# Patient Record
Sex: FEMALE | Race: WHITE | Employment: FULL TIME | URBAN - METROPOLITAN AREA
[De-identification: names, ages, dates, MRNs, and addresses within clinical notes are randomized per-mention and may not be internally consistent; named-entity substitution may affect disease eponyms.]

---

## 2017-12-22 ENCOUNTER — APPOINTMENT (OUTPATIENT)
Dept: OTHER | Facility: HOSPITAL | Age: 42
End: 2017-12-22
Attending: PREVENTIVE MEDICINE

## 2018-09-09 ENCOUNTER — HOSPITAL ENCOUNTER (OUTPATIENT)
Age: 43
Discharge: HOME | End: 2018-09-09
Attending: FAMILY MEDICINE
Payer: COMMERCIAL

## 2018-09-09 VITALS
BODY MASS INDEX: 29 KG/M2 | OXYGEN SATURATION: 100 % | DIASTOLIC BLOOD PRESSURE: 94 MMHG | TEMPERATURE: 98 F | RESPIRATION RATE: 18 BRPM | HEART RATE: 87 BPM | WEIGHT: 175 LBS | SYSTOLIC BLOOD PRESSURE: 138 MMHG

## 2018-09-09 DIAGNOSIS — H10.32 ACUTE CONJUNCTIVITIS OF LEFT EYE, UNSPECIFIED ACUTE CONJUNCTIVITIS TYPE: Primary | ICD-10-CM

## 2018-09-09 PROCEDURE — 99203 OFFICE O/P NEW LOW 30 MIN: CPT

## 2018-09-09 RX ORDER — OFLOXACIN 3 MG/ML
2 SOLUTION/ DROPS OPHTHALMIC EVERY 8 HOURS
Qty: 10 ML | Refills: 0 | Status: SHIPPED | OUTPATIENT
Start: 2018-09-09 | End: 2018-09-16

## 2018-09-09 NOTE — ED INITIAL ASSESSMENT (HPI)
Pt. Was told by a friend/co-worker on Thursday that her Left eye was red. Woke up today & it was a little crusty. Denies severe itching. Only wears reading glasses as needed.

## 2018-09-09 NOTE — ED PROVIDER NOTES
Patient Seen in: [facility] [place]    History   Patient presents with: Eye Visual Problem (opthalmic): Lt.    Stated Complaint: pink eye    HPI  This is a 47-year-old female coming in with left eye complaints.   Some left eye redness a periorbital area normal : Yes  - PERRL+ : Yes  - EOMI+: Yes  - Cornea : clear  - Conjunctiva: palpebral and bulbar conjunctival injection +   - Perilimbal injection: No  - Eyelids normal : Yes  - FB : No  - Photophobia: No  - Sclera  Injected: Yes, No  - P

## 2019-10-30 ENCOUNTER — OFFICE VISIT (OUTPATIENT)
Dept: OBGYN CLINIC | Facility: CLINIC | Age: 44
End: 2019-10-30
Payer: COMMERCIAL

## 2019-10-30 VITALS
HEIGHT: 65 IN | SYSTOLIC BLOOD PRESSURE: 118 MMHG | DIASTOLIC BLOOD PRESSURE: 74 MMHG | BODY MASS INDEX: 32.82 KG/M2 | WEIGHT: 197 LBS

## 2019-10-30 DIAGNOSIS — Z30.431 SURVEILLANCE OF (INTRAUTERINE) CONTRACEPTIVE DEVICE: ICD-10-CM

## 2019-10-30 DIAGNOSIS — Z12.39 SCREENING FOR MALIGNANT NEOPLASM OF BREAST: ICD-10-CM

## 2019-10-30 DIAGNOSIS — A63.0 WARTS, GENITAL: ICD-10-CM

## 2019-10-30 DIAGNOSIS — Z12.4 SCREENING FOR MALIGNANT NEOPLASM OF CERVIX: ICD-10-CM

## 2019-10-30 DIAGNOSIS — Z01.419 WELL WOMAN EXAM WITH ROUTINE GYNECOLOGICAL EXAM: Primary | ICD-10-CM

## 2019-10-30 PROCEDURE — 88175 CYTOPATH C/V AUTO FLUID REDO: CPT | Performed by: NURSE PRACTITIONER

## 2019-10-30 PROCEDURE — 99386 PREV VISIT NEW AGE 40-64: CPT | Performed by: NURSE PRACTITIONER

## 2019-10-30 PROCEDURE — 87624 HPV HI-RISK TYP POOLED RSLT: CPT | Performed by: NURSE PRACTITIONER

## 2019-10-30 NOTE — PROGRESS NOTES
HPI:   Hasmukh Pugh is a 45year old  who presents for an annual gynecological exam. Pt reports concern that her genital warts have reoccurred. She would like to discuss treatment options. Menses: No LMP recorded. Patient has had an implant.     No dominant masses on palpation, no lymphadenopathy  EXTREMITIES: No edema  EXTERNAL GENITALIA: Normal appearance for age, 3 small warts present between labia majora bilaterally.    URETHRA: No masses or tenderness, no prolapse  VAGINA: Normal, physiologic dis neoplasm of cervix  -     HPV HIGH RISK , THIN PREP COLLECTION; Future  -     THINPREP PAP SMEAR B; Future    Screening for malignant neoplasm of breast  -     [facility] MIRNA 2D+3D SCREENING BILAT (CPT=77067/90815);  Future    Surveillance of (intrauterine) contr

## 2019-11-11 ENCOUNTER — HOSPITAL ENCOUNTER (OUTPATIENT)
Dept: MAMMOGRAPHY | Facility: HOSPITAL | Age: 44
Discharge: HOME | End: 2019-11-11
Attending: NURSE PRACTITIONER
Payer: COMMERCIAL

## 2019-11-11 DIAGNOSIS — Z12.39 SCREENING FOR MALIGNANT NEOPLASM OF BREAST: ICD-10-CM

## 2019-11-11 PROCEDURE — 77063 BREAST TOMOSYNTHESIS BI: CPT | Performed by: NURSE PRACTITIONER

## 2019-11-11 PROCEDURE — 77067 SCR MAMMO BI INCL CAD: CPT | Performed by: NURSE PRACTITIONER

## 2019-11-15 ENCOUNTER — OFFICE VISIT (OUTPATIENT)
Dept: OBGYN CLINIC | Facility: CLINIC | Age: 44
End: 2019-11-15
Payer: COMMERCIAL

## 2019-11-15 VITALS
HEIGHT: 65 IN | DIASTOLIC BLOOD PRESSURE: 74 MMHG | BODY MASS INDEX: 32.65 KG/M2 | WEIGHT: 196 LBS | SYSTOLIC BLOOD PRESSURE: 116 MMHG | HEART RATE: 84 BPM

## 2019-11-15 DIAGNOSIS — B07.9: Primary | ICD-10-CM

## 2019-11-15 PROCEDURE — 56515 DESTROY VULVA LESION/S COMPL: CPT | Performed by: NURSE PRACTITIONER

## 2019-11-15 NOTE — PROGRESS NOTES
Jana Angulo is a 45year old female.     HPI:   Patient presents with:  Procedure: Wart removal       -Dx Discussed - Pt assured  -Pt counseled on safe sex practices and advised to refrain from intercourse during outbreaks. -Encouaged pt to use condoms dur

## 2019-12-16 ENCOUNTER — HOSPITAL ENCOUNTER (OUTPATIENT)
Dept: MAMMOGRAPHY | Facility: HOSPITAL | Age: 44
Discharge: HOME | End: 2019-12-16
Attending: NURSE PRACTITIONER
Payer: COMMERCIAL

## 2019-12-16 DIAGNOSIS — R92.2 INCONCLUSIVE MAMMOGRAM: ICD-10-CM

## 2019-12-16 PROCEDURE — 77061 BREAST TOMOSYNTHESIS UNI: CPT | Performed by: NURSE PRACTITIONER

## 2019-12-16 PROCEDURE — 77065 DX MAMMO INCL CAD UNI: CPT | Performed by: NURSE PRACTITIONER

## 2019-12-16 NOTE — IMAGING NOTE
This Breast Care RN assisted Dr. Tamara Mcmillan with recommendation for a RB 2 site stereo biopsy for calcs. Procedure reviewed and all questions answered. Emotional and educational support given.    On the day of the biopsy, pt instructed to take Tylenol 1000mg P

## 2020-01-07 ENCOUNTER — HOSPITAL ENCOUNTER (OUTPATIENT)
Dept: MAMMOGRAPHY | Facility: HOSPITAL | Age: 45
Discharge: HOME | End: 2020-01-07
Attending: NURSE PRACTITIONER
Payer: COMMERCIAL

## 2020-01-07 DIAGNOSIS — R92.1 BREAST CALCIFICATIONS: ICD-10-CM

## 2020-01-07 PROCEDURE — 88305 TISSUE EXAM BY PATHOLOGIST: CPT | Performed by: NURSE PRACTITIONER

## 2020-01-07 PROCEDURE — 88360 TUMOR IMMUNOHISTOCHEM/MANUAL: CPT | Performed by: NURSE PRACTITIONER

## 2020-01-07 PROCEDURE — 19081 BX BREAST 1ST LESION STRTCTC: CPT | Performed by: NURSE PRACTITIONER

## 2020-01-07 PROCEDURE — 19082 BX BREAST ADD LESION STRTCTC: CPT | Performed by: NURSE PRACTITIONER

## 2020-01-08 ENCOUNTER — TELEPHONE (OUTPATIENT)
Dept: OBGYN CLINIC | Facility: CLINIC | Age: 45
End: 2020-01-08

## 2020-01-08 ENCOUNTER — TELEPHONE (OUTPATIENT)
Dept: CT IMAGING | Facility: HOSPITAL | Age: 45
End: 2020-01-08

## 2020-01-08 NOTE — TELEPHONE ENCOUNTER
Telephoned Kamilla Jorge Luis Zamarripa and name,  verified with pt offered to meet with her today as her results are in. Pt requested results by phone. Notified [place] of LB stereo biopsy result of IDC and DCIS. Emotional support given.  YECENIA [place] is r

## 2020-01-08 NOTE — TELEPHONE ENCOUNTER
Left detailed message for breast care coordinator informing that Dr. Susan Frye should be who patient is referred to.    (Spoke with [facility])

## 2020-01-08 NOTE — TELEPHONE ENCOUNTER
[id number] Swanson Breast care coordinator calling and has a critical result    They need to know what surgeon to send this pt to    Pt has not been notified  Please call [phone]

## 2020-01-08 NOTE — TELEPHONE ENCOUNTER
LM w staff requesting call back w name of surgeon for f/u SP LB 2 site stereo bx. Awaiting call back.

## 2020-01-10 ENCOUNTER — OFFICE VISIT (OUTPATIENT)
Dept: SURGERY | Facility: CLINIC | Age: 45
End: 2020-01-10
Payer: COMMERCIAL

## 2020-01-10 ENCOUNTER — NURSE NAVIGATOR ENCOUNTER (OUTPATIENT)
Dept: HEMATOLOGY/ONCOLOGY | Facility: HOSPITAL | Age: 45
End: 2020-01-10

## 2020-01-10 ENCOUNTER — GENETICS ENCOUNTER (OUTPATIENT)
Dept: GENETICS | Facility: HOSPITAL | Age: 45
End: 2020-01-10
Attending: GENETIC COUNSELOR, MS
Payer: COMMERCIAL

## 2020-01-10 VITALS
DIASTOLIC BLOOD PRESSURE: 84 MMHG | BODY MASS INDEX: 33.66 KG/M2 | WEIGHT: 202 LBS | SYSTOLIC BLOOD PRESSURE: 123 MMHG | OXYGEN SATURATION: 100 % | RESPIRATION RATE: 16 BRPM | HEIGHT: 65 IN | HEART RATE: 81 BPM

## 2020-01-10 DIAGNOSIS — Z17.0 MALIGNANT NEOPLASM OF UPPER-OUTER QUADRANT OF LEFT BREAST IN FEMALE, ESTROGEN RECEPTOR POSITIVE (HCC): Primary | ICD-10-CM

## 2020-01-10 DIAGNOSIS — C50.412 MALIGNANT NEOPLASM OF UPPER-OUTER QUADRANT OF LEFT BREAST IN FEMALE, ESTROGEN RECEPTOR POSITIVE (HCC): Primary | ICD-10-CM

## 2020-01-10 PROCEDURE — 96040 HC GENETIC COUNSELING EA 30 MIN: CPT | Performed by: GENETIC COUNSELOR, MS

## 2020-01-10 PROCEDURE — 99205 OFFICE O/P NEW HI 60 MIN: CPT | Performed by: SURGERY

## 2020-01-10 NOTE — PROGRESS NOTES
Breast Surgery New Patient Consultation    This is the first visit for this 45year old woman, referred by Raven Rizvi, who presents for evaluation of breast cancer.     History of Present Illness:   Ms. Tanner Petit is a 45year old woman who prese Amoxicillin-Pot Clavulanate 875-125 MG Oral Tab, Take 1 tablet by mouth 2 (two) times daily for 10 days. , Disp: 20 tablet, Rfl: 0        Allergies:      Adhesive Tape (Sendy*    RASH    Family History:   Family History   Problem Relation Age of Onset   • bloody stools, constipation, yellowing of the skin, indigestion, nausea, change in bowel habits, diarrhea, abdominal pain or vomiting blood.      Genitourinary:  The patient denies frequent urination, needing to get up at night to urinate, urinary hesitancy no palpable masses. The trachea is in the midline. Conjunctiva are clear, non-icteric. Chest: The chest expands symmetrically. The lungs are clear to auscultation. Heart: The rhythm is regular. There are no murmurs, rubs, gallops or thrills.     GigSocial distinction between local and systemic disease and local and systemic therapy.  For local treatment options, I explained the risks and benefits of breast conservation and mastectomy (with or without reconstruction), including the fact that survival rates ar and she understood and agreed to the proposed plan. She was given ample opportunity for questions and those questions were answered to her satisfaction.  She has been  encouraged to contact the office with any questions or concerns prior to her next appoint

## 2020-01-10 NOTE — PROGRESS NOTES
Met with patient in clinic. Introduced myself as the breast navigator nurse and explained my role and how I will work with all of the physicians involved in her care.  Explained the role of all of the physicians involved in her care including the surgeonjuan carlos

## 2020-01-10 NOTE — PROGRESS NOTES
Referring Provider: Suhail Barger MD    Reason for Referral:  Epi Arana was referred for genetic counseling because of a new diagnosis of breast cancer.   Ms. Gladys Ribeiro is a 45year-old woman of [place] descent who was diagnosed with an ER/CT- recommendations (e.g., CHEK2, DA, RAD51C, RAD51D, PALB2) are identified in 3-10% of individuals tested using a multigene panel. Risk Assessment:   Ms. Sabrina Randolph meets NCCN Guidelines criteria for BRCA1/2 testing.   Based on My[employer] data the likelihood for for some of the genes for which testing is available, the associated cancer risks have yet to be determined and medical management recommendations may not yet be available for individuals with pathogenic variants in these genes.  If she were to test positiv decided to proceed with genetic testing. Written consent was obtained. Blood and paperwork were sent to [facility] for their Breast STAT panel (DA, BRCA1, BRCA2, CDH1, CHEK2, PALB2, PTEN, STK11, and TP53). I anticipate that Ms. Zamarripa's results will be tosha

## 2020-01-20 ENCOUNTER — GENETICS ENCOUNTER (OUTPATIENT)
Dept: HEMATOLOGY/ONCOLOGY | Facility: HOSPITAL | Age: 45
End: 2020-01-20

## 2020-01-20 ENCOUNTER — NURSE NAVIGATOR ENCOUNTER (OUTPATIENT)
Dept: HEMATOLOGY/ONCOLOGY | Facility: HOSPITAL | Age: 45
End: 2020-01-20

## 2020-01-20 NOTE — PROGRESS NOTES
LVM for patient regarding breast MRI, which patient cancelled. Contact information provided, encouraged pt to phone navigator with update.

## 2020-01-20 NOTE — PROGRESS NOTES
Referring Provider:       Marianna Martínez MD    Reason for Referral:  Aysha Bars had genetic testing performed on 1/10/2020 because of a new diagnosis of breast cancer at age 45.      Genetic Testing Result:  No pathogenic variant was found in the followi

## 2020-01-22 ENCOUNTER — NURSE NAVIGATOR ENCOUNTER (OUTPATIENT)
Dept: HEMATOLOGY/ONCOLOGY | Facility: HOSPITAL | Age: 45
End: 2020-01-22

## 2020-01-22 NOTE — PROGRESS NOTES
Pt returned navigator's phone call re: MRI.  Pt states that the time scheduled for her did not work, though she did not call to reschedule, she cancelled the test. Spoke with breast MRI schedulers, they will reach out to her to have testing rescheduled to p

## 2020-01-30 ENCOUNTER — TELEPHONE (OUTPATIENT)
Dept: SURGERY | Facility: CLINIC | Age: 45
End: 2020-01-30

## 2020-01-30 ENCOUNTER — HOSPITAL ENCOUNTER (OUTPATIENT)
Dept: MRI IMAGING | Facility: HOSPITAL | Age: 45
Discharge: HOME | End: 2020-01-30
Attending: SURGERY
Payer: COMMERCIAL

## 2020-01-30 DIAGNOSIS — Z17.0 MALIGNANT NEOPLASM OF UPPER-OUTER QUADRANT OF LEFT BREAST IN FEMALE, ESTROGEN RECEPTOR POSITIVE (HCC): ICD-10-CM

## 2020-01-30 DIAGNOSIS — C50.412 MALIGNANT NEOPLASM OF UPPER-OUTER QUADRANT OF LEFT BREAST IN FEMALE, ESTROGEN RECEPTOR POSITIVE (HCC): ICD-10-CM

## 2020-01-30 PROCEDURE — A9575 INJ GADOTERATE MEGLUMI 0.1ML: HCPCS | Performed by: SURGERY

## 2020-01-30 PROCEDURE — 77049 MRI BREAST C-+ W/CAD BI: CPT | Performed by: SURGERY

## 2020-01-30 NOTE — TELEPHONE ENCOUNTER
LVM for patient to let her know that Dr Sofia Shone reviewed her MRI, and the results match the mammogram with no additional findings. We can proceed with scheduling left breast wire bracketed lumpectomy with left sentinel lymph node biopsy.    Asked her to ca

## 2020-01-31 ENCOUNTER — TELEPHONE (OUTPATIENT)
Dept: SURGERY | Facility: CLINIC | Age: 45
End: 2020-01-31

## 2020-01-31 DIAGNOSIS — C50.412 MALIGNANT NEOPLASM OF UPPER-OUTER QUADRANT OF LEFT BREAST IN FEMALE, ESTROGEN RECEPTOR POSITIVE (HCC): Primary | ICD-10-CM

## 2020-01-31 DIAGNOSIS — Z17.0 MALIGNANT NEOPLASM OF UPPER-OUTER QUADRANT OF LEFT BREAST IN FEMALE, ESTROGEN RECEPTOR POSITIVE (HCC): Primary | ICD-10-CM

## 2020-01-31 NOTE — TELEPHONE ENCOUNTER
Contacted the patient to review the MRI results, and plan for wire bracketed lumpectomy with left SLN biopsy. Questions addressed. Reviewed surgical instructions with patient, and will email her a copy. Pt offered 2/14 or 2/20 at [facility].    Pt bari

## 2020-02-13 ENCOUNTER — TELEPHONE (OUTPATIENT)
Dept: MAMMOGRAPHY | Facility: HOSPITAL | Age: 45
End: 2020-02-13

## 2020-02-13 NOTE — TELEPHONE ENCOUNTER
Spoke with Ny Olsen regarding Council Lymph Node mapping to be done in nuclear medicine department before breast lumpectomy surgery scheduled for 2-14-20. Also reviewed wire localization to be done in [facility].  Both procedures explained and all terese

## 2020-02-14 ENCOUNTER — ANESTHESIA (OUTPATIENT)
Dept: SURGERY | Facility: HOSPITAL | Age: 45
End: 2020-02-14
Payer: COMMERCIAL

## 2020-02-14 ENCOUNTER — HOSPITAL ENCOUNTER (OUTPATIENT)
Dept: NUCLEAR MEDICINE | Facility: HOSPITAL | Age: 45
Discharge: HOME | End: 2020-02-14
Attending: SURGERY
Payer: COMMERCIAL

## 2020-02-14 ENCOUNTER — HOSPITAL ENCOUNTER (OUTPATIENT)
Facility: HOSPITAL | Age: 45
Setting detail: HOSPITAL OUTPATIENT SURGERY
Discharge: HOME | End: 2020-02-14
Attending: SURGERY | Admitting: SURGERY
Payer: COMMERCIAL

## 2020-02-14 ENCOUNTER — ANESTHESIA EVENT (OUTPATIENT)
Dept: SURGERY | Facility: HOSPITAL | Age: 45
End: 2020-02-14
Payer: COMMERCIAL

## 2020-02-14 ENCOUNTER — APPOINTMENT (OUTPATIENT)
Dept: MAMMOGRAPHY | Facility: HOSPITAL | Age: 45
End: 2020-02-14
Attending: SURGERY
Payer: COMMERCIAL

## 2020-02-14 VITALS
HEIGHT: 65 IN | HEART RATE: 102 BPM | DIASTOLIC BLOOD PRESSURE: 78 MMHG | TEMPERATURE: 99 F | OXYGEN SATURATION: 98 % | WEIGHT: 205.25 LBS | BODY MASS INDEX: 34.2 KG/M2 | RESPIRATION RATE: 16 BRPM | SYSTOLIC BLOOD PRESSURE: 112 MMHG

## 2020-02-14 DIAGNOSIS — C50.412 MALIGNANT NEOPLASM OF UPPER-OUTER QUADRANT OF LEFT BREAST IN FEMALE, ESTROGEN RECEPTOR POSITIVE (HCC): ICD-10-CM

## 2020-02-14 DIAGNOSIS — Z17.0 MALIGNANT NEOPLASM OF UPPER-OUTER QUADRANT OF LEFT BREAST IN FEMALE, ESTROGEN RECEPTOR POSITIVE (HCC): ICD-10-CM

## 2020-02-14 LAB
POCT LOT NUMBER: NORMAL
POCT URINE PREGNANCY: NEGATIVE

## 2020-02-14 PROCEDURE — 88307 TISSUE EXAM BY PATHOLOGIST: CPT | Performed by: SURGERY

## 2020-02-14 PROCEDURE — 78195 LYMPH SYSTEM IMAGING: CPT | Performed by: SURGERY

## 2020-02-14 PROCEDURE — 88360 TUMOR IMMUNOHISTOCHEM/MANUAL: CPT | Performed by: SURGERY

## 2020-02-14 PROCEDURE — 19281 PERQ DEVICE BREAST 1ST IMAG: CPT | Performed by: SURGERY

## 2020-02-14 PROCEDURE — 10036 PLMT SFT TISS LOCLZJ DEV EA: CPT | Performed by: SURGERY

## 2020-02-14 PROCEDURE — 88342 IMHCHEM/IMCYTCHM 1ST ANTB: CPT | Performed by: SURGERY

## 2020-02-14 PROCEDURE — 0HBU0ZZ EXCISION OF LEFT BREAST, OPEN APPROACH: ICD-10-PCS | Performed by: SURGERY

## 2020-02-14 PROCEDURE — 81025 URINE PREGNANCY TEST: CPT | Performed by: SURGERY

## 2020-02-14 PROCEDURE — 07B60ZX EXCISION OF LEFT AXILLARY LYMPHATIC, OPEN APPROACH, DIAGNOSTIC: ICD-10-PCS | Performed by: SURGERY

## 2020-02-14 PROCEDURE — 88305 TISSUE EXAM BY PATHOLOGIST: CPT | Performed by: SURGERY

## 2020-02-14 PROCEDURE — 76098 X-RAY EXAM SURGICAL SPECIMEN: CPT | Performed by: SURGERY

## 2020-02-14 RX ORDER — LIDOCAINE AND PRILOCAINE 25; 25 MG/G; MG/G
CREAM TOPICAL
Status: COMPLETED
Start: 2020-02-14 | End: 2020-02-14

## 2020-02-14 RX ORDER — LIDOCAINE AND PRILOCAINE 25; 25 MG/G; MG/G
CREAM TOPICAL ONCE
Status: COMPLETED | OUTPATIENT
Start: 2020-02-14 | End: 2020-02-14

## 2020-02-14 RX ORDER — DIAZEPAM 5 MG/1
5 TABLET ORAL AS NEEDED
Status: DISCONTINUED | OUTPATIENT
Start: 2020-02-14 | End: 2020-02-14 | Stop reason: HOSPADM

## 2020-02-14 RX ORDER — ONDANSETRON 2 MG/ML
4 INJECTION INTRAMUSCULAR; INTRAVENOUS AS NEEDED
Status: DISCONTINUED | OUTPATIENT
Start: 2020-02-14 | End: 2020-02-14

## 2020-02-14 RX ORDER — NALOXONE HYDROCHLORIDE 0.4 MG/ML
80 INJECTION, SOLUTION INTRAMUSCULAR; INTRAVENOUS; SUBCUTANEOUS AS NEEDED
Status: DISCONTINUED | OUTPATIENT
Start: 2020-02-14 | End: 2020-02-14

## 2020-02-14 RX ORDER — MIDAZOLAM HYDROCHLORIDE 1 MG/ML
1 INJECTION INTRAMUSCULAR; INTRAVENOUS EVERY 5 MIN PRN
Status: DISCONTINUED | OUTPATIENT
Start: 2020-02-14 | End: 2020-02-14

## 2020-02-14 RX ORDER — DEXAMETHASONE SODIUM PHOSPHATE 4 MG/ML
VIAL (ML) INJECTION AS NEEDED
Status: DISCONTINUED | OUTPATIENT
Start: 2020-02-14 | End: 2020-02-14 | Stop reason: SURG

## 2020-02-14 RX ORDER — METOCLOPRAMIDE HYDROCHLORIDE 5 MG/ML
INJECTION INTRAMUSCULAR; INTRAVENOUS AS NEEDED
Status: DISCONTINUED | OUTPATIENT
Start: 2020-02-14 | End: 2020-02-14 | Stop reason: SURG

## 2020-02-14 RX ORDER — LABETALOL HYDROCHLORIDE 5 MG/ML
5 INJECTION, SOLUTION INTRAVENOUS EVERY 5 MIN PRN
Status: DISCONTINUED | OUTPATIENT
Start: 2020-02-14 | End: 2020-02-14

## 2020-02-14 RX ORDER — CEFAZOLIN SODIUM/WATER 2 G/20 ML
SYRINGE (ML) INTRAVENOUS
Status: DISCONTINUED
Start: 2020-02-14 | End: 2020-02-14

## 2020-02-14 RX ORDER — CEFAZOLIN SODIUM/WATER 2 G/20 ML
2 SYRINGE (ML) INTRAVENOUS ONCE
Status: COMPLETED | OUTPATIENT
Start: 2020-02-14 | End: 2020-02-14

## 2020-02-14 RX ORDER — SODIUM CHLORIDE, SODIUM LACTATE, POTASSIUM CHLORIDE, CALCIUM CHLORIDE 600; 310; 30; 20 MG/100ML; MG/100ML; MG/100ML; MG/100ML
INJECTION, SOLUTION INTRAVENOUS CONTINUOUS
Status: DISCONTINUED | OUTPATIENT
Start: 2020-02-14 | End: 2020-02-14

## 2020-02-14 RX ORDER — HYDROCODONE BITARTRATE AND ACETAMINOPHEN 5; 325 MG/1; MG/1
1-2 TABLET ORAL EVERY 4 HOURS PRN
Qty: 30 TABLET | Refills: 0 | Status: SHIPPED | OUTPATIENT
Start: 2020-02-14 | End: 2020-03-06 | Stop reason: ALTCHOICE

## 2020-02-14 RX ORDER — MEPERIDINE HYDROCHLORIDE 25 MG/ML
12.5 INJECTION INTRAMUSCULAR; INTRAVENOUS; SUBCUTANEOUS AS NEEDED
Status: DISCONTINUED | OUTPATIENT
Start: 2020-02-14 | End: 2020-02-14

## 2020-02-14 RX ORDER — ONDANSETRON 2 MG/ML
INJECTION INTRAMUSCULAR; INTRAVENOUS AS NEEDED
Status: DISCONTINUED | OUTPATIENT
Start: 2020-02-14 | End: 2020-02-14 | Stop reason: SURG

## 2020-02-14 RX ORDER — HYDROCODONE BITARTRATE AND ACETAMINOPHEN 5; 325 MG/1; MG/1
2 TABLET ORAL AS NEEDED
Status: COMPLETED | OUTPATIENT
Start: 2020-02-14 | End: 2020-02-14

## 2020-02-14 RX ORDER — ACETAMINOPHEN 500 MG
1000 TABLET ORAL ONCE
Status: DISCONTINUED | OUTPATIENT
Start: 2020-02-14 | End: 2020-02-14 | Stop reason: HOSPADM

## 2020-02-14 RX ORDER — HYDROCODONE BITARTRATE AND ACETAMINOPHEN 5; 325 MG/1; MG/1
1 TABLET ORAL AS NEEDED
Status: COMPLETED | OUTPATIENT
Start: 2020-02-14 | End: 2020-02-14

## 2020-02-14 RX ORDER — KETOROLAC TROMETHAMINE 30 MG/ML
INJECTION, SOLUTION INTRAMUSCULAR; INTRAVENOUS AS NEEDED
Status: DISCONTINUED | OUTPATIENT
Start: 2020-02-14 | End: 2020-02-14 | Stop reason: SURG

## 2020-02-14 RX ORDER — LIDOCAINE HYDROCHLORIDE AND EPINEPHRINE 10; 10 MG/ML; UG/ML
INJECTION, SOLUTION INFILTRATION; PERINEURAL AS NEEDED
Status: DISCONTINUED | OUTPATIENT
Start: 2020-02-14 | End: 2020-02-14 | Stop reason: HOSPADM

## 2020-02-14 RX ORDER — LIDOCAINE HYDROCHLORIDE 10 MG/ML
INJECTION, SOLUTION EPIDURAL; INFILTRATION; INTRACAUDAL; PERINEURAL AS NEEDED
Status: DISCONTINUED | OUTPATIENT
Start: 2020-02-14 | End: 2020-02-14 | Stop reason: SURG

## 2020-02-14 RX ORDER — BUPIVACAINE HYDROCHLORIDE 5 MG/ML
INJECTION, SOLUTION EPIDURAL; INTRACAUDAL AS NEEDED
Status: DISCONTINUED | OUTPATIENT
Start: 2020-02-14 | End: 2020-02-14 | Stop reason: HOSPADM

## 2020-02-14 RX ORDER — 0.9 % SODIUM CHLORIDE 0.9 %
VIAL (ML) INJECTION AS NEEDED
Status: DISCONTINUED | OUTPATIENT
Start: 2020-02-14 | End: 2020-02-14 | Stop reason: HOSPADM

## 2020-02-14 RX ORDER — HYDROMORPHONE HYDROCHLORIDE 1 MG/ML
0.4 INJECTION, SOLUTION INTRAMUSCULAR; INTRAVENOUS; SUBCUTANEOUS EVERY 5 MIN PRN
Status: DISCONTINUED | OUTPATIENT
Start: 2020-02-14 | End: 2020-02-14

## 2020-02-14 RX ADMIN — METOCLOPRAMIDE HYDROCHLORIDE 10 MG: 5 INJECTION INTRAMUSCULAR; INTRAVENOUS at 11:12:00

## 2020-02-14 RX ADMIN — ONDANSETRON 4 MG: 2 INJECTION INTRAMUSCULAR; INTRAVENOUS at 11:12:00

## 2020-02-14 RX ADMIN — SODIUM CHLORIDE, SODIUM LACTATE, POTASSIUM CHLORIDE, CALCIUM CHLORIDE: 600; 310; 30; 20 INJECTION, SOLUTION INTRAVENOUS at 11:53:00

## 2020-02-14 RX ADMIN — CEFAZOLIN SODIUM/WATER 2 G: 2 G/20 ML SYRINGE (ML) INTRAVENOUS at 11:12:00

## 2020-02-14 RX ADMIN — DEXAMETHASONE SODIUM PHOSPHATE 8 MG: 4 MG/ML VIAL (ML) INJECTION at 11:12:00

## 2020-02-14 RX ADMIN — KETOROLAC TROMETHAMINE 30 MG: 30 INJECTION, SOLUTION INTRAMUSCULAR; INTRAVENOUS at 11:44:00

## 2020-02-14 RX ADMIN — LIDOCAINE HYDROCHLORIDE 50 MG: 10 INJECTION, SOLUTION EPIDURAL; INFILTRATION; INTRACAUDAL; PERINEURAL at 11:06:00

## 2020-02-14 NOTE — BRIEF OP NOTE
Pre-Operative Diagnosis: Malignant neoplasm of upper-outer quadrant of left breast in female, [facility] receptor [facility] [C50.412, Z17.0]     Post-Operative Diagnosis: Malignant neoplasm of upper-outer quadrant of left breast in female, estrogen rece

## 2020-02-14 NOTE — IMAGING NOTE
SLN mapping of left breast for [address] today. Written and verbal education given re lymphedema, breast conservation surgery and post surgical exercises. Then assisted Dr Radha Palacios with mammogram guided needle localization x 3 for lumpectomy.  Pt to

## 2020-02-14 NOTE — ANESTHESIA PREPROCEDURE EVALUATION
PRE-OP EVALUATION    Patient Name: José Brown    Pre-op Diagnosis: Malignant ne[facility] of upper[facility]outer quadrant of left [facility], [facility] [C50.412, Z17.0]    Procedure(s):  left wire bracketed lumpectomy with left sent II  Mouth opening: >3 FB  TM distance: > 6 cm  Neck ROM: full Cardiovascular    Cardiovascular exam normal.         Dental    No notable dental history.          Pulmonary    Pulmonary exam normal.                 Other findings            ASA: 1   Plan: MA

## 2020-02-14 NOTE — ANESTHESIA POSTPROCEDURE EVALUATION
[address] Reeves Patient Status:  Hospital Outpatient Surgery   Age/Gender 45year old female MRN [id number]   [facility] SURGERY Attending Renetta Kelly MD   Hosp Day # 0 PCP Beatriz Zhou MD       Anesthesia Post

## 2020-02-14 NOTE — H&P
History of Present Illness:   Ms. Hector Alex is a 45year old woman who presents with imaging detected left breast cancer. The patient denies any palpable masses, nipple discharge, skin changes or axillary symptoms.   She has no personal prior histo    Adhesive Tape (Sendy*    RASH     Family History:         Family History   Problem Relation Age of Onset   • Breast Cancer Mother 79   • Cancer Maternal Grandmother           lung   • Breast Cancer Maternal Grandmother 61   • Other (Other) Maternal Gra or vomiting blood.      Genitourinary:  The patient denies frequent urination, needing to get up at night to urinate, urinary hesitancy or retaining urine, painful urination, urinary incontinence, decreased urine stream, blood in the urine or vaginal/penil expands symmetrically. The lungs are clear to auscultation.     Heart: The rhythm is regular. There are no murmurs, rubs, gallops or thrills.     Breasts:  Her breasts are symmetrical with a cup size B.   Right breast: The skin, nipple ,and areola appear n I explained the risks and benefits of breast conservation and mastectomy (with or without reconstruction), including the fact that survival rates are equal with these two approaches.  If breast conservation is elected, I explained the need for free margins, those questions were answered to her satisfaction. She has been  encouraged to contact the office with any questions or concerns prior to her next appointment.      Pre-op Diagnosis: Malignant neoplasm of upper-outer quadrant of left breast in female, estro

## 2020-02-14 NOTE — ANESTHESIA PROCEDURE NOTES
Airway  Urgency: elective      General Information and Staff    Patient location during procedure: OR  Anesthesiologist: Staci Lin MD  Resident/CRNA: Adilson Springer CRNA  Performed: CRNA     Indications and Patient Condition  Indications for airway m

## 2020-02-15 NOTE — OPERATIVE REPORT
[facility]    PATIENT'S NAME: Tim, [id number] [facility] PHYSICIAN: Levon Travis. Ming Méndez M.D. OPERATING PHYSICIAN: Levon Travis. Ming Méndez M.D.    PATIENT ACCOUNT#:   [de-identified]    LOCATION:  [address]  MEDICAL RECORD #:    lymphoscintigraphy for mapping for sentinel lymph node identification preoperatively. She was brought to the operating room and placed in supine position. She was properly padded and secured. She was given a dose of IV antibiotics.   Sequential compressi oriented short stitch and 1 hemoclip in the superior position and a long stitch and 2 hemoclips in the lateral position was placed in the imaging device where specimen x-ray confirmed the presence of the targeted clips, residual calcifications with adequat

## 2020-02-21 ENCOUNTER — NURSE NAVIGATOR ENCOUNTER (OUTPATIENT)
Dept: HEMATOLOGY/ONCOLOGY | Facility: HOSPITAL | Age: 45
End: 2020-02-21

## 2020-02-21 ENCOUNTER — OFFICE VISIT (OUTPATIENT)
Dept: SURGERY | Facility: CLINIC | Age: 45
End: 2020-02-21
Payer: COMMERCIAL

## 2020-02-21 VITALS
HEART RATE: 63 BPM | SYSTOLIC BLOOD PRESSURE: 142 MMHG | TEMPERATURE: 98 F | DIASTOLIC BLOOD PRESSURE: 79 MMHG | BODY MASS INDEX: 34 KG/M2 | WEIGHT: 206 LBS | RESPIRATION RATE: 20 BRPM

## 2020-02-21 DIAGNOSIS — Z17.0 MALIGNANT NEOPLASM OF UPPER-OUTER QUADRANT OF LEFT BREAST IN FEMALE, ESTROGEN RECEPTOR POSITIVE (HCC): Primary | ICD-10-CM

## 2020-02-21 DIAGNOSIS — C50.412 MALIGNANT NEOPLASM OF UPPER-OUTER QUADRANT OF LEFT BREAST IN FEMALE, ESTROGEN RECEPTOR POSITIVE (HCC): Primary | ICD-10-CM

## 2020-02-21 PROBLEM — C50.912 INVASIVE DUCTAL CARCINOMA OF BREAST, FEMALE, LEFT (HCC): Status: ACTIVE | Noted: 2020-02-21

## 2020-02-21 PROCEDURE — 99024 POSTOP FOLLOW-UP VISIT: CPT | Performed by: SURGERY

## 2020-02-21 NOTE — PROGRESS NOTES
Oncotype DX test ordered online per Dr. King Butler    Phoned patient to discuss setting up medical and radiation oncology appointments. Will await return phone call. Contact information provided.

## 2020-02-24 ENCOUNTER — NURSE NAVIGATOR ENCOUNTER (OUTPATIENT)
Dept: HEMATOLOGY/ONCOLOGY | Facility: HOSPITAL | Age: 45
End: 2020-02-24

## 2020-02-24 ENCOUNTER — SOCIAL WORK SERVICES (OUTPATIENT)
Dept: HEMATOLOGY/ONCOLOGY | Facility: HOSPITAL | Age: 45
End: 2020-02-24

## 2020-02-24 NOTE — PROGRESS NOTES
Spoke to [facility] for [facility] regarding onoctype authorization- approved 2/21/2020- 5/20/2020- Auth #- [id number]

## 2020-02-24 NOTE — PROGRESS NOTES
ANAT left voice message with contact information requesting a  Return call to discuss FMLA paperwork.

## 2020-02-25 ENCOUNTER — NURSE NAVIGATOR ENCOUNTER (OUTPATIENT)
Dept: HEMATOLOGY/ONCOLOGY | Facility: HOSPITAL | Age: 45
End: 2020-02-25

## 2020-03-05 ENCOUNTER — SOCIAL WORK SERVICES (OUTPATIENT)
Dept: HEMATOLOGY/ONCOLOGY | Facility: HOSPITAL | Age: 45
End: 2020-03-05

## 2020-03-05 NOTE — PROGRESS NOTES
Sw spoke with patient about [place] paperwork.  Sw completed paperwork for intermittent leave from 2/14/2020-8/14/2020 at patient request.

## 2020-03-06 ENCOUNTER — OFFICE VISIT (OUTPATIENT)
Dept: SURGERY | Facility: CLINIC | Age: 45
End: 2020-03-06
Payer: COMMERCIAL

## 2020-03-06 ENCOUNTER — NURSE NAVIGATOR ENCOUNTER (OUTPATIENT)
Dept: HEMATOLOGY/ONCOLOGY | Facility: HOSPITAL | Age: 45
End: 2020-03-06

## 2020-03-06 VITALS
OXYGEN SATURATION: 100 % | SYSTOLIC BLOOD PRESSURE: 127 MMHG | HEART RATE: 87 BPM | RESPIRATION RATE: 16 BRPM | DIASTOLIC BLOOD PRESSURE: 85 MMHG

## 2020-03-06 DIAGNOSIS — Z17.0 MALIGNANT NEOPLASM OF UPPER-OUTER QUADRANT OF LEFT BREAST IN FEMALE, ESTROGEN RECEPTOR POSITIVE (HCC): Primary | ICD-10-CM

## 2020-03-06 DIAGNOSIS — C50.412 MALIGNANT NEOPLASM OF UPPER-OUTER QUADRANT OF LEFT BREAST IN FEMALE, ESTROGEN RECEPTOR POSITIVE (HCC): Primary | ICD-10-CM

## 2020-03-06 PROCEDURE — 99024 POSTOP FOLLOW-UP VISIT: CPT | Performed by: SURGERY

## 2020-03-10 NOTE — PROGRESS NOTES
[facility] Consultation Note      Patient Name: Jatinder Miranda   YOB: 1975  Medical Record Number: [id number]  Consulting Physician: Chasidy Neil. Chad Ward M.D. Referring Physician: Alexia Cisneros M.D.     Date of Consultati variant in the following 9 genes: DA, BRCA1, BRCA2, CDH1, CHEK2, PALB2, PTEN, STK11, and TP53    Patient was premenopausal at diagnosis. Performance Status   Karnofsky 100% - Normal, no complaints.     Past Medical History   Left breast cancer (as abov in gait. Psychiatric          No new or worsening depression, sara, mood swings, insomnia.     Vital Signs   /83 (BP Location: Right arm, Patient Position: Sitting, Cuff Size: large)   Pulse 83   Temp 97.6 °F (36.4 °C) (Tympanic)   Resp 16   Ht 1.6 g/dL    HCT 37.2 35.0 - 48.0 %    .0 150.0 - 450.0 10(3)uL    MCV 88.2 80.0 - 100.0 fL    MCH 28.7 26.0 - 34.0 pg    MCHC 32.5 31.0 - 37.0 g/dL    RDW 14.6 11.0 - 15.0 %    RDW-SD 47.4 (H) 35.1 - 46.3 fL    Neutrophil Absolute Prelim 4.18 1.50 - 7.7 lymph node).     B- Excision, left breast, lateral margin:  -Benign breast tissue.     C- Excision, left breast, anterior margin:  -Benign breast tissue.     D- Excision, left breast, superior margin:  -Benign breast tissue.     E- Excision, left breast, i Estrogen Receptor   -   SP1 80% Positive Cells Moderate Positive   Progesterone Receptor   -   16 60% Positive Cells Moderate Positive       B.   Left breast, 4:00 calcifications, stereotactic biopsy:  -Ductal carcinoma in situ, nuclear grade 3, cribrifor spent counseling the patient and/or on coordination of care. The diagnosis, prognosis, and recommended treatment were discussed in detail. All questions were answered to the patient's satisfaction. Electronically signed by:    Rai Almanza M.D.

## 2020-03-12 ENCOUNTER — OFFICE VISIT (OUTPATIENT)
Dept: HEMATOLOGY/ONCOLOGY | Facility: HOSPITAL | Age: 45
End: 2020-03-12
Attending: GENETIC COUNSELOR, MS
Payer: COMMERCIAL

## 2020-03-12 VITALS
RESPIRATION RATE: 16 BRPM | HEIGHT: 65 IN | BODY MASS INDEX: 34.74 KG/M2 | SYSTOLIC BLOOD PRESSURE: 129 MMHG | OXYGEN SATURATION: 99 % | HEART RATE: 83 BPM | WEIGHT: 208.5 LBS | TEMPERATURE: 98 F | DIASTOLIC BLOOD PRESSURE: 83 MMHG

## 2020-03-12 DIAGNOSIS — C50.912 INVASIVE DUCTAL CARCINOMA OF BREAST, FEMALE, LEFT (HCC): ICD-10-CM

## 2020-03-12 LAB
ALBUMIN SERPL-MCNC: 3.6 G/DL (ref 3.4–5)
ALBUMIN/GLOB SERPL: 0.9 {RATIO} (ref 1–2)
ALP LIVER SERPL-CCNC: 55 U/L (ref 37–98)
ALT SERPL-CCNC: 19 U/L (ref 13–56)
ANION GAP SERPL CALC-SCNC: 5 MMOL/L (ref 0–18)
AST SERPL-CCNC: 13 U/L (ref 15–37)
BASOPHILS # BLD AUTO: 0.06 X10(3) UL (ref 0–0.2)
BASOPHILS NFR BLD AUTO: 0.7 %
BILIRUB SERPL-MCNC: 0.4 MG/DL (ref 0.1–2)
BUN BLD-MCNC: 16 MG/DL (ref 7–18)
BUN/CREAT SERPL: 19.5 (ref 10–20)
CALCIUM BLD-MCNC: 8.7 MG/DL (ref 8.5–10.1)
CHLORIDE SERPL-SCNC: 107 MMOL/L (ref 98–112)
CO2 SERPL-SCNC: 24 MMOL/L (ref 21–32)
CREAT BLD-MCNC: 0.82 MG/DL (ref 0.55–1.02)
DEPRECATED RDW RBC AUTO: 47.4 FL (ref 35.1–46.3)
EOSINOPHIL # BLD AUTO: 0.18 X10(3) UL (ref 0–0.7)
EOSINOPHIL NFR BLD AUTO: 2.1 %
ERYTHROCYTE [DISTWIDTH] IN BLOOD BY AUTOMATED COUNT: 14.6 % (ref 11–15)
GLOBULIN PLAS-MCNC: 3.9 G/DL (ref 2.8–4.4)
GLUCOSE BLD-MCNC: 89 MG/DL (ref 70–99)
HCT VFR BLD AUTO: 37.2 % (ref 35–48)
HGB BLD-MCNC: 12.1 G/DL (ref 12–16)
IMM GRANULOCYTES # BLD AUTO: 0.03 X10(3) UL (ref 0–1)
IMM GRANULOCYTES NFR BLD: 0.4 %
LYMPHOCYTES # BLD AUTO: 3.3 X10(3) UL (ref 1–4)
LYMPHOCYTES NFR BLD AUTO: 39.4 %
M PROTEIN MFR SERPL ELPH: 7.5 G/DL (ref 6.4–8.2)
MCH RBC QN AUTO: 28.7 PG (ref 26–34)
MCHC RBC AUTO-ENTMCNC: 32.5 G/DL (ref 31–37)
MCV RBC AUTO: 88.2 FL (ref 80–100)
MONOCYTES # BLD AUTO: 0.63 X10(3) UL (ref 0.1–1)
MONOCYTES NFR BLD AUTO: 7.5 %
NEUTROPHILS # BLD AUTO: 4.18 X10 (3) UL (ref 1.5–7.7)
NEUTROPHILS # BLD AUTO: 4.18 X10(3) UL (ref 1.5–7.7)
NEUTROPHILS NFR BLD AUTO: 49.9 %
OSMOLALITY SERPL CALC.SUM OF ELEC: 283 MOSM/KG (ref 275–295)
PLATELET # BLD AUTO: 243 10(3)UL (ref 150–450)
POTASSIUM SERPL-SCNC: 4.1 MMOL/L (ref 3.5–5.1)
RBC # BLD AUTO: 4.22 X10(6)UL (ref 3.8–5.3)
SODIUM SERPL-SCNC: 136 MMOL/L (ref 136–145)
WBC # BLD AUTO: 8.4 X10(3) UL (ref 4–11)

## 2020-03-12 PROCEDURE — 99205 OFFICE O/P NEW HI 60 MIN: CPT | Performed by: SPECIALIST

## 2020-03-12 NOTE — PROGRESS NOTES
Patient is here today for Consult with Adam Castillo for Breast Cancer. Patient denies pain. Medication list and medical history were reviewed and updated.      Education Record    Learner:  Patient and spouse    Disease / Diagnosis: Consult    Lesia / Debbie Knott

## 2020-03-16 ENCOUNTER — NURSE NAVIGATOR ENCOUNTER (OUTPATIENT)
Dept: HEMATOLOGY/ONCOLOGY | Facility: HOSPITAL | Age: 45
End: 2020-03-16

## 2020-03-16 NOTE — PROGRESS NOTES
LVM for patient in regards to chemotherapy recommendations. Encouraged pt to phone navigator with questions or concerns, or if she would like to get set up to start. Contact information provided.

## 2020-03-19 ENCOUNTER — SOCIAL WORK SERVICES (OUTPATIENT)
Dept: HEMATOLOGY/ONCOLOGY | Facility: HOSPITAL | Age: 45
End: 2020-03-19

## 2020-03-19 ENCOUNTER — OFFICE VISIT (OUTPATIENT)
Dept: HEMATOLOGY/ONCOLOGY | Facility: HOSPITAL | Age: 45
End: 2020-03-19
Attending: GENETIC COUNSELOR, MS
Payer: COMMERCIAL

## 2020-03-19 VITALS
BODY MASS INDEX: 34.32 KG/M2 | TEMPERATURE: 99 F | HEART RATE: 68 BPM | RESPIRATION RATE: 16 BRPM | DIASTOLIC BLOOD PRESSURE: 87 MMHG | WEIGHT: 206 LBS | SYSTOLIC BLOOD PRESSURE: 136 MMHG | HEIGHT: 64.84 IN

## 2020-03-19 DIAGNOSIS — C50.912 INVASIVE DUCTAL CARCINOMA OF BREAST, FEMALE, LEFT (HCC): ICD-10-CM

## 2020-03-19 DIAGNOSIS — Z51.11 ENCOUNTER FOR CHEMOTHERAPY MANAGEMENT: ICD-10-CM

## 2020-03-19 DIAGNOSIS — C50.912 INVASIVE DUCTAL CARCINOMA OF BREAST, FEMALE, LEFT (HCC): Primary | ICD-10-CM

## 2020-03-19 LAB
ALBUMIN SERPL-MCNC: 3.6 G/DL (ref 3.4–5)
ALBUMIN/GLOB SERPL: 0.9 {RATIO} (ref 1–2)
ALP LIVER SERPL-CCNC: 56 U/L (ref 37–98)
ALT SERPL-CCNC: 19 U/L (ref 13–56)
ANION GAP SERPL CALC-SCNC: 4 MMOL/L (ref 0–18)
AST SERPL-CCNC: 18 U/L (ref 15–37)
BASOPHILS # BLD AUTO: 0.04 X10(3) UL (ref 0–0.2)
BASOPHILS NFR BLD AUTO: 0.6 %
BILIRUB SERPL-MCNC: 0.4 MG/DL (ref 0.1–2)
BUN BLD-MCNC: 14 MG/DL (ref 7–18)
BUN/CREAT SERPL: 13.9 (ref 10–20)
CALCIUM BLD-MCNC: 8.6 MG/DL (ref 8.5–10.1)
CHLORIDE SERPL-SCNC: 111 MMOL/L (ref 98–112)
CO2 SERPL-SCNC: 26 MMOL/L (ref 21–32)
CREAT BLD-MCNC: 1.01 MG/DL (ref 0.55–1.02)
DEPRECATED RDW RBC AUTO: 48.6 FL (ref 35.1–46.3)
EOSINOPHIL # BLD AUTO: 0.18 X10(3) UL (ref 0–0.7)
EOSINOPHIL NFR BLD AUTO: 2.8 %
ERYTHROCYTE [DISTWIDTH] IN BLOOD BY AUTOMATED COUNT: 14.9 % (ref 11–15)
GLOBULIN PLAS-MCNC: 3.8 G/DL (ref 2.8–4.4)
GLUCOSE BLD-MCNC: 105 MG/DL (ref 70–99)
HCT VFR BLD AUTO: 39.1 % (ref 35–48)
HGB BLD-MCNC: 12.8 G/DL (ref 12–16)
IMM GRANULOCYTES # BLD AUTO: 0.01 X10(3) UL (ref 0–1)
IMM GRANULOCYTES NFR BLD: 0.2 %
LYMPHOCYTES # BLD AUTO: 2.58 X10(3) UL (ref 1–4)
LYMPHOCYTES NFR BLD AUTO: 40.8 %
M PROTEIN MFR SERPL ELPH: 7.4 G/DL (ref 6.4–8.2)
MCH RBC QN AUTO: 29 PG (ref 26–34)
MCHC RBC AUTO-ENTMCNC: 32.7 G/DL (ref 31–37)
MCV RBC AUTO: 88.5 FL (ref 80–100)
MONOCYTES # BLD AUTO: 0.36 X10(3) UL (ref 0.1–1)
MONOCYTES NFR BLD AUTO: 5.7 %
NEUTROPHILS # BLD AUTO: 3.15 X10 (3) UL (ref 1.5–7.7)
NEUTROPHILS # BLD AUTO: 3.15 X10(3) UL (ref 1.5–7.7)
NEUTROPHILS NFR BLD AUTO: 49.9 %
OSMOLALITY SERPL CALC.SUM OF ELEC: 293 MOSM/KG (ref 275–295)
PLATELET # BLD AUTO: 265 10(3)UL (ref 150–450)
POTASSIUM SERPL-SCNC: 3.9 MMOL/L (ref 3.5–5.1)
RBC # BLD AUTO: 4.42 X10(6)UL (ref 3.8–5.3)
SODIUM SERPL-SCNC: 141 MMOL/L (ref 136–145)
WBC # BLD AUTO: 6.3 X10(3) UL (ref 4–11)

## 2020-03-19 PROCEDURE — 96413 CHEMO IV INFUSION 1 HR: CPT

## 2020-03-19 PROCEDURE — 96417 CHEMO IV INFUS EACH ADDL SEQ: CPT

## 2020-03-19 PROCEDURE — 96375 TX/PRO/DX INJ NEW DRUG ADDON: CPT

## 2020-03-19 PROCEDURE — 36415 COLL VENOUS BLD VENIPUNCTURE: CPT

## 2020-03-19 PROCEDURE — 96377 APPLICATON ON-BODY INJECTOR: CPT

## 2020-03-19 PROCEDURE — 80053 COMPREHEN METABOLIC PANEL: CPT

## 2020-03-19 PROCEDURE — 99211 OFF/OP EST MAY X REQ PHY/QHP: CPT

## 2020-03-19 PROCEDURE — 85025 COMPLETE CBC W/AUTO DIFF WBC: CPT

## 2020-03-19 RX ORDER — ONDANSETRON 8 MG/1
8 TABLET, ORALLY DISINTEGRATING ORAL EVERY 8 HOURS PRN
Qty: 30 TABLET | Refills: 3 | Status: SHIPPED | OUTPATIENT
Start: 2020-03-19 | End: 2020-06-26 | Stop reason: ALTCHOICE

## 2020-03-19 RX ORDER — PROCHLORPERAZINE MALEATE 10 MG
10 TABLET ORAL EVERY 6 HOURS PRN
Qty: 30 TABLET | Refills: 3 | Status: SHIPPED | OUTPATIENT
Start: 2020-03-19 | End: 2020-06-26 | Stop reason: ALTCHOICE

## 2020-03-19 NOTE — PROGRESS NOTES
Patient presents with:  Pt Ed: Chemo Education    TC Taxotere/Cytoxan C1 D1 Patient here for Chemo education and treatment currently taking no medications at this time.      Education Record    Learner:  Patient and Spouse    Disease / Diagnosis:    Barrier

## 2020-03-19 NOTE — PROGRESS NOTES
Chemotherapy Education    Learner:  Patient and Family Member    Barriers / Limitations:  Emotional factors    Chemotherapy education goals:  · Learn the drug names  · Administration schedule  · Routes of administration  · Treatment setting    Drug names: neuropathy.     Treatment Effects on the Bladder and Kidneys:    Function of the kidneys and bladder:  Needs reinforcement    Signs / symptoms of hemorrhagic cystitis:  Needs reinforcement    Suggested fluid intake:  Needs reinforcement    Notify MD/RN if b discussed self care techniques, symptom management, fluid, diet, and activities.

## 2020-03-19 NOTE — PROGRESS NOTES
SW met with patient to discuss role of Sw and provide contact information. Patient reports that she has a good support system and had no needs at this time. Sw did provide information on FMLA and contact information for future use.  Sw will remain available

## 2020-03-19 NOTE — PATIENT INSTRUCTIONS
On-body Injector for Neulasta Patient Instructions       Your On-Body Injection device was applied on this day:  3-19-20 at this time 2:10pm    Your dose of medication will start on this day: 3-20-20 at this time

## 2020-03-19 NOTE — PROGRESS NOTES
Pt here for C1D1.   Arrives Ambulating independently, accompanied by Self           Patient reports possible pregnancy since last therapy cycle: No    Modifications in dose or schedule: No     Frequency of blood return and site check throughout administrati

## 2020-03-20 ENCOUNTER — TELEPHONE (OUTPATIENT)
Dept: HEMATOLOGY/ONCOLOGY | Facility: HOSPITAL | Age: 45
End: 2020-03-20

## 2020-03-20 ENCOUNTER — APPOINTMENT (OUTPATIENT)
Dept: HEMATOLOGY/ONCOLOGY | Facility: HOSPITAL | Age: 45
End: 2020-03-20
Attending: GENETIC COUNSELOR, MS
Payer: COMMERCIAL

## 2020-03-20 NOTE — TELEPHONE ENCOUNTER
Toxicities: C1 D1 Docetaxel/Cyclophosphamide/Neulasta Onpro on 3/19/2020    Nausea/Insomnia    Nausea: Grade 1 (Pt having some intermittent nausea since treatment. She said she did not know when she should start taking the antiemetics. I reviewed how to alternate between the two. I also encouraged her to use them to control the nausea so she can continue to eat the 4-5 small, protein rich meals & drink the recommended 64-80 oz of caffeine free fluids daily.)  Insomnia: Grade 1 (Pt said she woke up around 2 am. She could not go back to sleep. I explained that she did get a steroid in her premed. That should wear off soon. She said she is also concerned about the COVID-19 virus. She has been watching a lot of coverage.)    I recommended she take a compazine now. I reviewed the CDC guidelines of how to protect herself. I suggested she limit how long she watches the coverage. I also recommended she take time out to watch other shows & do other activities that she enjoys. Carla Thorne thanked me for my recommendations. I encouraged her to call the office if she is not feeling well or she has any questions or concerns.                                                       Nausea: Grade 1

## 2020-03-23 NOTE — PROGRESS NOTES
Breast Surgery Post-Operative Visit    Diagnosis: Left breast cancer status post lumpectomy and sentinel lymph node biopsy on February 14, 2020.     Stage: Cancer Staging  Invasive ductal carcinoma of breast, female, left ([facility]  Staging form: Breast, AJCC 8 Age 21's       Past Surgical History:   Procedure Laterality Date   • BREAST SENTINEL LYMPH NODE BIOPSY Left 2020    Performed by Mercedes Neff MD at [facility] MAIN OR   •      •      • OTHER      gastric sleeve       Gyn cough, phlegm, hemoptysis, pleurisy/chest pain, pneumonia, asthma, wheezing, difficulty in breathing with exertion, emphysema, chronic bronchitis, shortness of breath or abnormal sound when breathing. Cardiovascular:   There is no history of chest pain, Allergic/Immunologic:  There is no history of hives, hay fever, angioedema or anaphylaxis.     /79 (BP Location: Right arm, Patient Position: Sitting)   Pulse 63   Temp 97.6 °F (36.4 °C) (Tympanic)   Resp 20   Wt 93.4 kg (206 lb)   BMI 34.28 kg/m²

## 2020-03-23 NOTE — PROGRESS NOTES
Breast Surgery Surveillance Visit    Diagnosis: Left breast cancer status post lumpectomy and sentinel lymph node biopsy on February 14, 2020.     Stage: Cancer Staging  Invasive ductal carcinoma of breast, female, left [facility])  Staging form: Breast, AJCC 8th Past Surgical History:   Procedure Laterality Date   • BREAST SENTINEL LYMPH NODE BIOPSY Left 2020    Performed by Alison Arechiga MD at [facility] MAIN OR   •   /   •      • OTHER      gastric sleeve       Gynecologica phlegm, hemoptysis, pleurisy/chest pain, pneumonia, asthma, wheezing, difficulty in breathing with exertion, emphysema, chronic bronchitis, shortness of breath or abnormal sound when breathing. Cardiovascular:   There is no history of chest pain, chest Allergic/Immunologic:  There is no history of hives, hay fever, angioedema or anaphylaxis.     /85 (BP Location: Right arm, Patient Position: Sitting, Cuff Size: adult)   Pulse 87   Resp 16   SpO2 100%     Physical Examination:   Examination shows

## 2020-03-24 ENCOUNTER — TELEPHONE (OUTPATIENT)
Dept: HEMATOLOGY/ONCOLOGY | Facility: HOSPITAL | Age: 45
End: 2020-03-24

## 2020-03-24 NOTE — TELEPHONE ENCOUNTER
Sonia Julian called saying that since her chemo on Thursday, her neck glands have swollen and are a little tender. She says her voice is also scratchy.  Please call

## 2020-03-24 NOTE — TELEPHONE ENCOUNTER
Breast Cancer - Carbo/Taxotere, neulasta    Noticed glandes in neck seem swollen, no pain, maybe scratchy throat, no cough, no fever. Instructed patient to continue to monitor for fever and to call.   Push fluids, eat small frequent meals, follow CDC recom

## 2020-04-01 ENCOUNTER — TELEPHONE (OUTPATIENT)
Dept: HEMATOLOGY/ONCOLOGY | Facility: HOSPITAL | Age: 45
End: 2020-04-01

## 2020-04-01 NOTE — TELEPHONE ENCOUNTER
Left message to return call.  Patient scheduled for PL/MD and chemo on 4/9 - Would like patient to change appointment to 4/10/2010

## 2020-04-09 ENCOUNTER — OFFICE VISIT (OUTPATIENT)
Dept: HEMATOLOGY/ONCOLOGY | Facility: HOSPITAL | Age: 45
End: 2020-04-09
Attending: GENETIC COUNSELOR, MS
Payer: COMMERCIAL

## 2020-04-09 VITALS
RESPIRATION RATE: 18 BRPM | HEIGHT: 64.84 IN | TEMPERATURE: 96 F | WEIGHT: 209.63 LBS | DIASTOLIC BLOOD PRESSURE: 82 MMHG | SYSTOLIC BLOOD PRESSURE: 128 MMHG | BODY MASS INDEX: 34.93 KG/M2 | OXYGEN SATURATION: 100 % | HEART RATE: 79 BPM

## 2020-04-09 DIAGNOSIS — T45.1X5A ANEMIA ASSOCIATED WITH CHEMOTHERAPY: ICD-10-CM

## 2020-04-09 DIAGNOSIS — C50.912 INVASIVE DUCTAL CARCINOMA OF BREAST, FEMALE, LEFT (HCC): Primary | ICD-10-CM

## 2020-04-09 DIAGNOSIS — T45.1X5A CHEMOTHERAPY-INDUCED NAUSEA: ICD-10-CM

## 2020-04-09 DIAGNOSIS — R11.0 CHEMOTHERAPY-INDUCED NAUSEA: ICD-10-CM

## 2020-04-09 DIAGNOSIS — R43.2 DYSGEUSIA: ICD-10-CM

## 2020-04-09 DIAGNOSIS — K52.1 CHEMOTHERAPY INDUCED DIARRHEA: ICD-10-CM

## 2020-04-09 DIAGNOSIS — T45.1X5A CHEMOTHERAPY INDUCED DIARRHEA: ICD-10-CM

## 2020-04-09 DIAGNOSIS — D64.81 ANEMIA ASSOCIATED WITH CHEMOTHERAPY: ICD-10-CM

## 2020-04-09 PROCEDURE — 99215 OFFICE O/P EST HI 40 MIN: CPT | Performed by: INTERNAL MEDICINE

## 2020-04-09 PROCEDURE — 96417 CHEMO IV INFUS EACH ADDL SEQ: CPT

## 2020-04-09 PROCEDURE — 96377 APPLICATON ON-BODY INJECTOR: CPT

## 2020-04-09 PROCEDURE — 96413 CHEMO IV INFUSION 1 HR: CPT

## 2020-04-09 PROCEDURE — 96375 TX/PRO/DX INJ NEW DRUG ADDON: CPT

## 2020-04-09 NOTE — PROGRESS NOTES
Hematology/Oncology Clinic Follow Up Visit    Patient Name: Jr Saldana  Medical Record Number: [id number]    YOB: 1975   Primary Oncologist: Dr. Thomas Hudson     Reason for Consultation:  Jr Saldana was seen today for the diagnosis o follow-up and for cycle 2 chemotherapy. For the first week following cycle 1 TC she had some fatigue, diarrhea, and transient diffuse joint aches. Tylenol did not help the pains much but pain was not persistent.   Fatigue improved after about a week or 2 Yes      Alcohol/week: 7.0 standard drinks      Types: 7 Glasses of wine per week      Frequency: 4 or more times a week      Drinks per session: 1 or 2    Drug use: No    Family Medical History:  Family History   Problem Relation Age of Onset   • Breast C 427.0 04/09/2020    .0 03/19/2020    .0 03/12/2020     Lab Results   Component Value Date    GLU 98 04/09/2020    BUN 15 04/09/2020    BUNCREA 16.5 04/09/2020    CREATSERUM 0.91 04/09/2020    CREATSERUM 1.01 03/19/2020    CREATSERUM 0.82 03/1 MD  Hematology/Medical [address] [place] [address]

## 2020-04-09 NOTE — PROGRESS NOTES
Diarrhea 2-3 times per day for about a week after chemo. Took a few doses of imodium. Adjusted her diet, BRAT   Reports intermittent joint pain, OTC meds didn't help.        Outpatient Oncology Care Plan  Problem list:  fatigue  nausea and vomiting    P

## 2020-04-09 NOTE — PATIENT INSTRUCTIONS
On-body Injector for Neulasta Patient Instructions       Your On-Body Injection device was applied on this day: 4/9 at 2:55 pm    Your dose of medication will start on this day: 4/10 at  5:55 pm    Safe time to remove this device will be on this day: 4/10

## 2020-04-10 ENCOUNTER — TELEPHONE (OUTPATIENT)
Dept: HEMATOLOGY/ONCOLOGY | Facility: HOSPITAL | Age: 45
End: 2020-04-10

## 2020-04-10 NOTE — TELEPHONE ENCOUNTER
Toxicities: C2 D1 Docetaxel/Cyclophosphamide/Neulasta OnPro on 4/9/2020    Fever/Joint aches/Fatigue/Nausea/Dehydration    Fever: Grade 1 (Woke up warm this morning. Did not check temp but took Aleve. Now having chills & fever of 100.8.  Denies cold/flu sym

## 2020-04-13 ENCOUNTER — TELEPHONE (OUTPATIENT)
Dept: HEMATOLOGY/ONCOLOGY | Facility: HOSPITAL | Age: 45
End: 2020-04-13

## 2020-04-13 NOTE — TELEPHONE ENCOUNTER
Toxicities: C2 D1 Docetaxel/Cyclophosphamide/Neulasta on pro on 4/9/2020    Splotchy redness by IV site Right Forearm: (This afternoon the pt said she developed a red, splotchy [place] 2\" in diameter around the IV site on her right forearm from treatment o

## 2020-04-15 ENCOUNTER — TELEPHONE (OUTPATIENT)
Dept: HEMATOLOGY/ONCOLOGY | Facility: HOSPITAL | Age: 45
End: 2020-04-15

## 2020-04-15 NOTE — TELEPHONE ENCOUNTER
Breast Cancer - 4/9/20 - Docetaxel/Cyclophosphamide/Neulasta C2D1    Update to splotchy redness to IV site of right forearm. Site the same, not worse, red, no drainage or warmth. Used cold pack when irritated. Denies fever.   Instructed to continue to mon

## 2020-04-22 ENCOUNTER — TELEPHONE (OUTPATIENT)
Dept: HEMATOLOGY/ONCOLOGY | Facility: HOSPITAL | Age: 45
End: 2020-04-22

## 2020-04-22 ENCOUNTER — OFFICE VISIT (OUTPATIENT)
Dept: HEMATOLOGY/ONCOLOGY | Facility: HOSPITAL | Age: 45
End: 2020-04-22
Attending: GENETIC COUNSELOR, MS
Payer: COMMERCIAL

## 2020-04-22 VITALS
RESPIRATION RATE: 16 BRPM | TEMPERATURE: 97 F | OXYGEN SATURATION: 98 % | SYSTOLIC BLOOD PRESSURE: 134 MMHG | DIASTOLIC BLOOD PRESSURE: 84 MMHG | HEART RATE: 94 BPM

## 2020-04-22 DIAGNOSIS — I80.8 PHLEBITIS OF RIGHT ARM: Primary | ICD-10-CM

## 2020-04-22 DIAGNOSIS — C50.912 INVASIVE DUCTAL CARCINOMA OF BREAST, FEMALE, LEFT (HCC): ICD-10-CM

## 2020-04-22 PROCEDURE — 99213 OFFICE O/P EST LOW 20 MIN: CPT | Performed by: CLINICAL NURSE SPECIALIST

## 2020-04-22 RX ORDER — CEPHALEXIN 500 MG/1
500 CAPSULE ORAL 4 TIMES DAILY
Qty: 40 CAPSULE | Refills: 0 | Status: SHIPPED | OUTPATIENT
Start: 2020-04-22 | End: 2020-06-12 | Stop reason: ALTCHOICE

## 2020-04-22 NOTE — PROGRESS NOTES
ANP Visit Note    Patient Name: Tanner Petit   YOB: 1975   Medical Record Number: [id number]   CSN: [id number]   Date of visit: 4/22/2020   Artice Simmonds, MD   No primary care provider on file.      Chief Complaint/Reason for Visit:  Pa Illness:    Patient is here with her . She states that the day after her chemo the right arm at the site of the IV for her chemo was a bit itchy and red, this persisted and she had a fever 101. The fever subsided.  The redness has spread and is more Transportation needs:        Medical: Not on file        Non-medical: Not on file    Tobacco Use      Smoking status: Never Smoker      Smokeless tobacco: Never Used    Substance and Sexual Activity      Alcohol use:  Yes        Alcohol/week: 7.0 standard d total) by mouth every 8 (eight) hours as needed for Nausea., Disp: 30 tablet, Rfl: 3  •  Prochlorperazine Maleate (COMPAZINE) 10 mg tablet, Take 1 tablet (10 mg total) by mouth every 6 (six) hours as needed for Nausea., Disp: 30 tablet, Rfl: 3    Narcotic

## 2020-04-22 NOTE — PROGRESS NOTES
Patient here today for acute visit. Patient has redness on her rt forearm where her IV site was. Patient state this developed 2 days post treatment. Patient also had a fever for one day that Friday that was as high as 100.8. Next day fever was gone.  [place]

## 2020-04-22 NOTE — TELEPHONE ENCOUNTER
Toxicities: C1 D2 Docetaxel/Cyclophosphamide 4/9/2020    Condition Update: Redness at Right Forearm PIV site (IV site has a [place] of red with areas of darker red around the PIV site. Pt is having a burning pain \"4/10. \"  Used hydrocortisone cream on 4/15

## 2020-04-22 NOTE — TELEPHONE ENCOUNTER
Smiley Vance called saying that since her last chemo 4/9, she has had a rash on her right arm where the IV was. She says it isn't sore, but it's tender and uncomfortable.  Please call

## 2020-04-30 ENCOUNTER — OFFICE VISIT (OUTPATIENT)
Dept: HEMATOLOGY/ONCOLOGY | Facility: HOSPITAL | Age: 45
End: 2020-04-30
Attending: GENETIC COUNSELOR, MS
Payer: COMMERCIAL

## 2020-04-30 VITALS
RESPIRATION RATE: 16 BRPM | SYSTOLIC BLOOD PRESSURE: 115 MMHG | WEIGHT: 208.81 LBS | HEIGHT: 64.84 IN | BODY MASS INDEX: 34.79 KG/M2 | OXYGEN SATURATION: 100 % | TEMPERATURE: 97 F | DIASTOLIC BLOOD PRESSURE: 80 MMHG | HEART RATE: 67 BPM

## 2020-04-30 DIAGNOSIS — C50.912 INVASIVE DUCTAL CARCINOMA OF BREAST, FEMALE, LEFT (HCC): Primary | ICD-10-CM

## 2020-04-30 DIAGNOSIS — T45.1X5A CHEMOTHERAPY INDUCED DIARRHEA: ICD-10-CM

## 2020-04-30 DIAGNOSIS — K52.1 CHEMOTHERAPY INDUCED DIARRHEA: ICD-10-CM

## 2020-04-30 PROCEDURE — 99214 OFFICE O/P EST MOD 30 MIN: CPT | Performed by: SPECIALIST

## 2020-04-30 PROCEDURE — 96417 CHEMO IV INFUS EACH ADDL SEQ: CPT

## 2020-04-30 PROCEDURE — 96377 APPLICATON ON-BODY INJECTOR: CPT

## 2020-04-30 PROCEDURE — 96413 CHEMO IV INFUSION 1 HR: CPT

## 2020-04-30 PROCEDURE — 96375 TX/PRO/DX INJ NEW DRUG ADDON: CPT

## 2020-04-30 NOTE — PATIENT INSTRUCTIONS
On-body Injector for Neulasta Patient Instructions       Your On-Body Injection device was applied on this day:  Thursday, April 30th at this time 2:00pm    Your dose of medication will start on this day: Friday,

## 2020-05-01 NOTE — PROGRESS NOTES
[facility] Progress Note      Patient Name: Leonor Rivas   YOB: 1975  Medical Record Number: [id number]  Attending Physician: Brant Hammans Aisha Shad, M.D.      Date of Visit: 4/30/2020       Chief Complaint  Invasive ductal carc CHEK2, PALB2, PTEN, STK11, and TP53    Patient was premenopausal at diagnosis. Patient started adjuvant chemotherapy with docetaxel and cyclophosphamide on 03/19/2020. Cycle 1 was complicated by transient fatigue, diffuse joint aches, and diarrhea.  Cyc (500 mg total) by mouth 4 (four) times daily. , Disp: 40 capsule, Rfl: 0  ondansetron 8 MG Oral Tablet Dispersible, Take 1 tablet (8 mg total) by mouth every 8 (eight) hours as needed for Nausea., Disp: 30 tablet, Rfl: 3  Prochlorperazine Maleate (COMPAZINE Collection Time: 04/30/20 10:54 AM   Result Value Ref Range    Glucose 87 70 - 99 mg/dL    Sodium 138 136 - 145 mmol/L    Potassium 3.7 3.5 - 5.1 mmol/L    Chloride 108 98 - 112 mmol/L    CO2 24.0 21.0 - 32.0 mmol/L    Anion Gap 6 0 - 18 mmol/L    BUN 1 modification. As she has undergone lumpectomy, I do recommend adjuvant radiation therapy. She will be referred to radiation oncology at her next visit. As her tumor is hormone receptor positive, I do recommend adjuvant endocrine therapy.

## 2020-05-21 ENCOUNTER — OFFICE VISIT (OUTPATIENT)
Dept: HEMATOLOGY/ONCOLOGY | Facility: HOSPITAL | Age: 45
End: 2020-05-21
Attending: GENETIC COUNSELOR, MS
Payer: COMMERCIAL

## 2020-05-21 VITALS
WEIGHT: 204.38 LBS | OXYGEN SATURATION: 99 % | RESPIRATION RATE: 16 BRPM | TEMPERATURE: 98 F | HEART RATE: 81 BPM | SYSTOLIC BLOOD PRESSURE: 114 MMHG | HEIGHT: 64.84 IN | DIASTOLIC BLOOD PRESSURE: 82 MMHG | BODY MASS INDEX: 34.05 KG/M2

## 2020-05-21 DIAGNOSIS — C50.912 INVASIVE DUCTAL CARCINOMA OF BREAST, FEMALE, LEFT (HCC): Primary | ICD-10-CM

## 2020-05-21 PROCEDURE — 96377 APPLICATON ON-BODY INJECTOR: CPT

## 2020-05-21 PROCEDURE — 99213 OFFICE O/P EST LOW 20 MIN: CPT | Performed by: SPECIALIST

## 2020-05-21 PROCEDURE — 96375 TX/PRO/DX INJ NEW DRUG ADDON: CPT

## 2020-05-21 PROCEDURE — 96413 CHEMO IV INFUSION 1 HR: CPT

## 2020-05-21 PROCEDURE — 96417 CHEMO IV INFUS EACH ADDL SEQ: CPT

## 2020-05-21 NOTE — PATIENT INSTRUCTIONS
On-body Injector for Neulasta Patient Instructions       Your On-Body Injection device was applied on this day:  5/21/2020 at this time 3pm    Your dose of medication will start on this day: 5/22/2020 at this mark

## 2020-05-21 NOTE — PROGRESS NOTES
Pt here for C4D1 Taxotere Cytoxan, Onpro.   Arrives Ambulating independently, accompanied by Self           Patient reports possible pregnancy since last therapy cycle: No    Modifications in dose or schedule: No     Frequency of blood return and site check

## 2020-05-21 NOTE — PROGRESS NOTES
[facility] Progress Note      Patient Name: Jason Aggarwal   YOB: 1975  Medical Record Number: [id number]  Attending Physician: Carley Park M.D.      Date of Visit: 5/21/2020      Chief Complaint  Invasive ductal carci PALB2, PTEN, STK11, and TP53    Patient was premenopausal at diagnosis. Patient started adjuvant chemotherapy with docetaxel and cyclophosphamide on 03/19/2020. Cycle 1 was complicated by transient fatigue, diffuse joint aches, and diarrhea.  Cycle 2 wa 3  Prochlorperazine Maleate (COMPAZINE) 10 mg tablet, Take 1 tablet (10 mg total) by mouth every 6 (six) hours as needed for Nausea., Disp: 30 tablet, Rfl: 3      Allergies   Ms. Zamarripa is allergic to adhesive tape (rosins).        Review of Systems   Consti Chloride 111 98 - 112 mmol/L    CO2 24.0 21.0 - 32.0 mmol/L    Anion Gap 5 0 - 18 mmol/L    BUN 12 7 - 18 mg/dL    Creatinine 0.80 0.55 - 1.02 mg/dL    BUN/CREA Ratio 15.0 10.0 - 20.0    Calcium, Total 8.8 8.5 - 10.1 mg/dL    Calculated Osmolality 289 275 As her tumor is hormone receptor positive, I do recommend adjuvant endocrine therapy. I discussed the option of tamoxifen versus ovarian suppression and tamoxifen/aromatase inhibitor. We will discuss again after the completion of radiation therapy.      Estela

## 2020-06-09 ENCOUNTER — APPOINTMENT (OUTPATIENT)
Dept: RADIATION ONCOLOGY | Facility: HOSPITAL | Age: 45
End: 2020-06-09
Attending: RADIOLOGY
Payer: COMMERCIAL

## 2020-06-12 ENCOUNTER — HOSPITAL ENCOUNTER (OUTPATIENT)
Dept: RADIATION ONCOLOGY | Facility: HOSPITAL | Age: 45
Discharge: HOME | End: 2020-06-12
Attending: RADIOLOGY
Payer: COMMERCIAL

## 2020-06-12 VITALS
BODY MASS INDEX: 35 KG/M2 | WEIGHT: 209 LBS | RESPIRATION RATE: 18 BRPM | TEMPERATURE: 98 F | OXYGEN SATURATION: 99 % | DIASTOLIC BLOOD PRESSURE: 94 MMHG | HEART RATE: 90 BPM | SYSTOLIC BLOOD PRESSURE: 137 MMHG

## 2020-06-12 PROCEDURE — 99214 OFFICE O/P EST MOD 30 MIN: CPT

## 2020-06-12 NOTE — CONSULTS
RADIATION ONCOLOGY NOTE    DATE OF VISIT: 6/12/2020    DIAGNOSIS :  Stage IA (pT1c, pN0, cM0, G3, ER+, MI+, HER2-) , s/p lumpectomy and SNLB and TC chemotherapy, recovering well for adjuvant XRT.     Dear Karel Valente and colleagues    As you recall, significant skin reaction, has concern she will experience same. Review of Systems   Constitutional: Positive for fatigue. HENT: Negative. Eyes: Negative. Respiratory: Negative. Cardiovascular: Negative. Gastrointestinal: Negative.     End grandmother; Heart Disorder in her paternal grandfather and paternal grandmother; Other in her maternal grandfather.     Wt Readings from Last 6 Encounters:  06/12/20 : 94.8 kg (209 lb)  05/21/20 : 92.7 kg (204 lb 6.4 oz)  04/30/20 : 94.7 kg (208 lb 12.8 oz noted otherwise, only the newest values recorded on each date are displayed.          Oncology Flowsheet 3/19/20 4/9/20 4/30/20 5/21/20   Day, Cycle Day 1, Cycle 1 Day 1, Cycle 2 Day 1, Cycle 3 Day 1, Cycle 4   cyclophosphamide (CYTOXAN)  mg/m2  = 1,2 Addendum electronically signed by Ajit Grimaldo MD on 3/9/2020 at 5014 Addendum 1   Quantitative Immunohistochemistry:   Material:  Block H6  Population:   Tumor Cells     Antibody & Clone              Result Interpretation   Her2   -   CB11 assurance and proficiency testing regarding the assay is in compliance with the published ASCO-CAP Guidelines (2018).   Addendum electronically signed by Lamonte Flanagan MD on 2/19/2020 at [phone] Final Diagnosis:   A- Excision, left breast sentinel lymph no Specimen Laterality  Left    TUMOR   Clock Position of Tumor Site  3 o'clock      4 o'clock    Histologic Type  Invasive carcinoma of no special type (invasive ductal carcinoma, not otherwise specified)    Glandular (Acinar) / Tubular Differentiation  Sc Testing Performed on Previous Biopsy     Progesterone Receptor (PgR)  Positive (percentage): 100 %   Breast Biomarker Testing Performed on Previous Biopsy     HER2 (by immunohistochemistry)  Negative (Score 1+)    Breast Biomarker Testing Performed on Prev formalin:  Specimen consists of a 3.4 x 2.5 x 1.2 cm irregular portion of tan-yellow lobulated fibrous mammary tissue. There is a clip at one aspect which designates the true margin as inked black. The remainder of the surface is inked green.   No atypica portion of tan-yellow lobulated fibrous mammary tissue. There is a clip at one aspect which designates the true margin as inked black. The remainder of the surface is inked green. No atypical lesions are identified.   The specimen is submitted in its ent approximately 20% tan-pink and tan-white rubbery fibrous tissue and 80% tan-yellow lobulated fat.   Representative sections are submitted in cassettes as follows:    H1 -  Perpendicular medial margin, slice 1   Q4-R7 - Slice 10   Q3-Y1 - Slice 11 to include CB11 1+ Negative         Guidelines for HER2 IHC  3+:  based on circumferential membrane staining that is complete, intense, and in  >10% of tumor cells. 2+:  based on weak to moderate complete membrane staining observed in >10% of tumor cells.   1+:  as is employed. All antibodies are validated by [facility] Department of Pathology to document appropriate staining reactions. Positive controls are utilized and show appropriate reactivity.    Addendum electronically signed by Samantha Gonzalez MD on 1 x 0.3 cm.   The specimen is inked blue and submitted in toto in cassette A2.      Breast Processing Data:   Time of Excision:   1:27 p.m. on 01/07/2020  Ischemic Time:    1 minute  Formalin Fixation Time:   7 hours 32 minutes      B- Labeled with the angie are noted. Recommend spot magnification images.     =====  CONCLUSION:     DIAGNOSTIC CATEGORY 0--INCOMPLETE ASSESSMENT: NEED ADDITIONAL IMAGING EVALUATION.        RECOMMENDATIONS:    ADDITIONAL MAMMOGRAPHIC VIEWS REQUIRED: LEFT BREAST  --We will call the

## 2020-06-12 NOTE — PATIENT INSTRUCTIONS
- CT SIMULATION SCAN SCHEDULED FOR: Tuesday 6/16/20 at 8:00 am    This is the first step in the radiation planning or \"mapping\" process  Once the physician completes your individualized treatment plan & your insurance authorization has been received

## 2020-06-12 NOTE — PROGRESS NOTES
Nursing Consultation Note  Patient: Lien Floyd  YOB: 1975  Age: 45year old  Radiation Oncologist: Dr. Johnnie Miguel  Referring Physician: [email or web address]  Consult Date: 6/12/2020      Chemotherapy: completed TC x Cardiovascular: Negative. Gastrointestinal: Negative. Endocrine: Negative. Genitourinary: Negative. Musculoskeletal: Negative. Skin: Negative. Allergic/Immunologic: Negative. Neurological: Negative. Hematological: Negative.     Psy Substance and Sexual Activity      Alcohol use:  Yes        Alcohol/week: 7.0 standard drinks        Types: 7 Glasses of wine per week        Frequency: 4 or more times a week        Drinks per session: 1 or 2      Drug use: No      Sexual activity: Yes skin care  Instruct on purpose of radiation therapy  Instruct on side effects of radiation therapy    Expected Outcomes:  Knowledge of care plan  Knowledge of radiation therapy  Knowledge of medications  Knowledge of side effects of radiation therapy and m

## 2020-06-16 ENCOUNTER — HOSPITAL ENCOUNTER (OUTPATIENT)
Dept: RADIATION ONCOLOGY | Facility: HOSPITAL | Age: 45
Discharge: HOME | End: 2020-06-16
Attending: RADIOLOGY
Payer: COMMERCIAL

## 2020-06-16 PROCEDURE — 77333 RADIATION TREATMENT AID(S): CPT | Performed by: RADIOLOGY

## 2020-06-16 PROCEDURE — 77290 THER RAD SIMULAJ FIELD CPLX: CPT | Performed by: RADIOLOGY

## 2020-06-17 PROCEDURE — 77300 RADIATION THERAPY DOSE PLAN: CPT | Performed by: RADIOLOGY

## 2020-06-17 PROCEDURE — 77295 3-D RADIOTHERAPY PLAN: CPT | Performed by: RADIOLOGY

## 2020-06-17 PROCEDURE — 77334 RADIATION TREATMENT AID(S): CPT | Performed by: RADIOLOGY

## 2020-06-19 ENCOUNTER — LAB ENCOUNTER (OUTPATIENT)
Dept: LAB | Facility: HOSPITAL | Age: 45
End: 2020-06-19
Attending: RADIOLOGY
Payer: COMMERCIAL

## 2020-06-19 DIAGNOSIS — Z01.812 PRE-PROCEDURE LAB EXAM: Primary | ICD-10-CM

## 2020-06-23 ENCOUNTER — SOCIAL WORK SERVICES (OUTPATIENT)
Dept: HEMATOLOGY/ONCOLOGY | Facility: HOSPITAL | Age: 45
End: 2020-06-23

## 2020-06-23 ENCOUNTER — HOSPITAL ENCOUNTER (OUTPATIENT)
Dept: RADIATION ONCOLOGY | Facility: HOSPITAL | Age: 45
Discharge: HOME | End: 2020-06-23
Attending: RADIOLOGY
Payer: COMMERCIAL

## 2020-06-23 PROCEDURE — 77280 THER RAD SIMULAJ FIELD SMPL: CPT | Performed by: RADIOLOGY

## 2020-06-23 PROCEDURE — 77412 RADIATION TX DELIVERY LVL 3: CPT | Performed by: RADIOLOGY

## 2020-06-24 PROCEDURE — 77290 THER RAD SIMULAJ FIELD CPLX: CPT | Performed by: RADIOLOGY

## 2020-06-24 PROCEDURE — 77412 RADIATION TX DELIVERY LVL 3: CPT | Performed by: RADIOLOGY

## 2020-06-25 PROCEDURE — 77387 GUIDANCE FOR RADJ TX DLVR: CPT | Performed by: RADIOLOGY

## 2020-06-25 PROCEDURE — 77412 RADIATION TX DELIVERY LVL 3: CPT | Performed by: RADIOLOGY

## 2020-06-26 ENCOUNTER — HOSPITAL ENCOUNTER (OUTPATIENT)
Dept: RADIATION ONCOLOGY | Facility: HOSPITAL | Age: 45
Discharge: HOME | End: 2020-06-26
Attending: RADIOLOGY
Payer: COMMERCIAL

## 2020-06-26 VITALS
HEART RATE: 100 BPM | TEMPERATURE: 98 F | RESPIRATION RATE: 18 BRPM | SYSTOLIC BLOOD PRESSURE: 96 MMHG | OXYGEN SATURATION: 100 % | DIASTOLIC BLOOD PRESSURE: 61 MMHG

## 2020-06-26 PROCEDURE — 77387 GUIDANCE FOR RADJ TX DLVR: CPT | Performed by: RADIOLOGY

## 2020-06-26 PROCEDURE — 77412 RADIATION TX DELIVERY LVL 3: CPT | Performed by: RADIOLOGY

## 2020-06-26 PROCEDURE — 77336 RADIATION PHYSICS CONSULT: CPT | Performed by: RADIOLOGY

## 2020-06-26 NOTE — PROGRESS NOTES
[facility] Radiation Treatment Management Note 1-5    Patient:  Sammy Ruiz  Age:  45year old  Visit Diagnosis:  No diagnosis found.   Primary Rad/Onc:  Dr. Ivana House Wang    Site Delivered Dose (Gy) Prescribed Dose (Gy) Fraction #

## 2020-06-29 PROCEDURE — 77387 GUIDANCE FOR RADJ TX DLVR: CPT | Performed by: RADIOLOGY

## 2020-06-29 PROCEDURE — 77412 RADIATION TX DELIVERY LVL 3: CPT | Performed by: RADIOLOGY

## 2020-06-30 PROCEDURE — 77412 RADIATION TX DELIVERY LVL 3: CPT | Performed by: RADIOLOGY

## 2020-06-30 PROCEDURE — 77387 GUIDANCE FOR RADJ TX DLVR: CPT | Performed by: RADIOLOGY

## 2020-07-01 ENCOUNTER — HOSPITAL ENCOUNTER (OUTPATIENT)
Dept: RADIATION ONCOLOGY | Facility: HOSPITAL | Age: 45
Discharge: HOME | End: 2020-07-01
Attending: RADIOLOGY
Payer: COMMERCIAL

## 2020-07-01 PROCEDURE — 77387 GUIDANCE FOR RADJ TX DLVR: CPT | Performed by: RADIOLOGY

## 2020-07-01 PROCEDURE — 77412 RADIATION TX DELIVERY LVL 3: CPT | Performed by: RADIOLOGY

## 2020-07-02 ENCOUNTER — HOSPITAL ENCOUNTER (OUTPATIENT)
Dept: RADIATION ONCOLOGY | Facility: HOSPITAL | Age: 45
Discharge: HOME | End: 2020-07-02
Attending: RADIOLOGY
Payer: COMMERCIAL

## 2020-07-02 VITALS
DIASTOLIC BLOOD PRESSURE: 83 MMHG | RESPIRATION RATE: 18 BRPM | TEMPERATURE: 98 F | SYSTOLIC BLOOD PRESSURE: 122 MMHG | OXYGEN SATURATION: 98 % | HEART RATE: 67 BPM

## 2020-07-02 DIAGNOSIS — C50.912 INVASIVE DUCTAL CARCINOMA OF BREAST, FEMALE, LEFT (HCC): Primary | ICD-10-CM

## 2020-07-02 PROCEDURE — 77387 GUIDANCE FOR RADJ TX DLVR: CPT | Performed by: RADIOLOGY

## 2020-07-02 PROCEDURE — 77412 RADIATION TX DELIVERY LVL 3: CPT | Performed by: RADIOLOGY

## 2020-07-02 PROCEDURE — 77336 RADIATION PHYSICS CONSULT: CPT | Performed by: RADIOLOGY

## 2020-07-02 NOTE — PROGRESS NOTES
[facility] Radiation Treatment Management Note 6-10    Patient:  Ny Olsen  Age:  45year old  Visit Diagnosis:  L breast IDC, pT1c N0 cM0, stage IA, ER/[place]+, HER2-, Ki-67 15%  Primary Rad/Onc:  Dr. Milton Homans    Site Delivered D

## 2020-07-06 PROCEDURE — 77412 RADIATION TX DELIVERY LVL 3: CPT | Performed by: RADIOLOGY

## 2020-07-06 PROCEDURE — 77387 GUIDANCE FOR RADJ TX DLVR: CPT | Performed by: RADIOLOGY

## 2020-07-07 PROCEDURE — 77412 RADIATION TX DELIVERY LVL 3: CPT | Performed by: RADIOLOGY

## 2020-07-07 PROCEDURE — 77387 GUIDANCE FOR RADJ TX DLVR: CPT | Performed by: RADIOLOGY

## 2020-07-08 PROCEDURE — 77412 RADIATION TX DELIVERY LVL 3: CPT | Performed by: RADIOLOGY

## 2020-07-08 PROCEDURE — 77387 GUIDANCE FOR RADJ TX DLVR: CPT | Performed by: RADIOLOGY

## 2020-07-09 ENCOUNTER — HOSPITAL ENCOUNTER (OUTPATIENT)
Dept: RADIATION ONCOLOGY | Facility: HOSPITAL | Age: 45
Discharge: HOME | End: 2020-07-09
Attending: RADIOLOGY
Payer: COMMERCIAL

## 2020-07-09 VITALS
BODY MASS INDEX: 34 KG/M2 | HEART RATE: 67 BPM | RESPIRATION RATE: 16 BRPM | OXYGEN SATURATION: 100 % | DIASTOLIC BLOOD PRESSURE: 84 MMHG | TEMPERATURE: 96 F | WEIGHT: 202.81 LBS | SYSTOLIC BLOOD PRESSURE: 117 MMHG

## 2020-07-09 DIAGNOSIS — C50.912 INVASIVE DUCTAL CARCINOMA OF BREAST, FEMALE, LEFT (HCC): Primary | ICD-10-CM

## 2020-07-09 PROCEDURE — 77333 RADIATION TREATMENT AID(S): CPT | Performed by: RADIOLOGY

## 2020-07-09 PROCEDURE — 77290 THER RAD SIMULAJ FIELD CPLX: CPT | Performed by: RADIOLOGY

## 2020-07-09 PROCEDURE — 77412 RADIATION TX DELIVERY LVL 3: CPT | Performed by: RADIOLOGY

## 2020-07-09 NOTE — PROGRESS NOTES
[facility] Radiation Treatment Management Note 11-15    Patient:  Tricia Pak  Age:  45year old  Visit Diagnosis:  L breast IDC, pT1c N0 cM0, stage IA, ER/[place]+, HER2-, Ki-67 15%  Primary Rad/Onc:  Dr. Washington Done    Site Delivered

## 2020-07-10 ENCOUNTER — APPOINTMENT (OUTPATIENT)
Dept: RADIATION ONCOLOGY | Facility: HOSPITAL | Age: 45
End: 2020-07-10
Attending: RADIOLOGY
Payer: COMMERCIAL

## 2020-07-10 ENCOUNTER — NURSE NAVIGATOR ENCOUNTER (OUTPATIENT)
Dept: HEMATOLOGY/ONCOLOGY | Facility: HOSPITAL | Age: 45
End: 2020-07-10

## 2020-07-10 PROCEDURE — 77336 RADIATION PHYSICS CONSULT: CPT | Performed by: RADIOLOGY

## 2020-07-14 PROCEDURE — 77295 3-D RADIOTHERAPY PLAN: CPT | Performed by: RADIOLOGY

## 2020-07-14 PROCEDURE — 77300 RADIATION THERAPY DOSE PLAN: CPT | Performed by: RADIOLOGY

## 2020-07-14 PROCEDURE — 77412 RADIATION TX DELIVERY LVL 3: CPT | Performed by: RADIOLOGY

## 2020-07-14 PROCEDURE — 77334 RADIATION TREATMENT AID(S): CPT | Performed by: RADIOLOGY

## 2020-07-15 PROCEDURE — 77387 GUIDANCE FOR RADJ TX DLVR: CPT | Performed by: RADIOLOGY

## 2020-07-15 PROCEDURE — 77412 RADIATION TX DELIVERY LVL 3: CPT | Performed by: RADIOLOGY

## 2020-07-16 PROCEDURE — 77387 GUIDANCE FOR RADJ TX DLVR: CPT | Performed by: RADIOLOGY

## 2020-07-16 PROCEDURE — 77412 RADIATION TX DELIVERY LVL 3: CPT | Performed by: RADIOLOGY

## 2020-07-17 ENCOUNTER — HOSPITAL ENCOUNTER (OUTPATIENT)
Dept: RADIATION ONCOLOGY | Facility: HOSPITAL | Age: 45
Discharge: HOME | End: 2020-07-17
Attending: RADIOLOGY
Payer: COMMERCIAL

## 2020-07-17 VITALS
DIASTOLIC BLOOD PRESSURE: 86 MMHG | RESPIRATION RATE: 16 BRPM | HEART RATE: 64 BPM | WEIGHT: 205.38 LBS | BODY MASS INDEX: 34 KG/M2 | SYSTOLIC BLOOD PRESSURE: 120 MMHG | OXYGEN SATURATION: 100 % | TEMPERATURE: 97 F

## 2020-07-17 DIAGNOSIS — C50.912 INVASIVE DUCTAL CARCINOMA OF BREAST, FEMALE, LEFT (HCC): Primary | ICD-10-CM

## 2020-07-17 PROCEDURE — 77280 THER RAD SIMULAJ FIELD SMPL: CPT | Performed by: INTERNAL MEDICINE

## 2020-07-17 PROCEDURE — 77412 RADIATION TX DELIVERY LVL 3: CPT | Performed by: INTERNAL MEDICINE

## 2020-07-17 PROCEDURE — 77336 RADIATION PHYSICS CONSULT: CPT | Performed by: INTERNAL MEDICINE

## 2020-07-17 NOTE — PROGRESS NOTES
[facility] Radiation Treatment Management Note 16-20    Patient:  Santiago Soriano  Age:  45year old  Visit Diagnosis:  L breast IDC, pT1c N0 cM0, stage IA, ER/[place]+, HER2-, Ki-67 15%  Primary Rad/Onc:  Dr. Real Been

## 2020-07-20 PROCEDURE — 77387 GUIDANCE FOR RADJ TX DLVR: CPT | Performed by: RADIOLOGY

## 2020-07-20 PROCEDURE — 77412 RADIATION TX DELIVERY LVL 3: CPT | Performed by: RADIOLOGY

## 2020-07-21 ENCOUNTER — NURSE NAVIGATOR ENCOUNTER (OUTPATIENT)
Dept: HEMATOLOGY/ONCOLOGY | Facility: HOSPITAL | Age: 45
End: 2020-07-21

## 2020-07-21 PROCEDURE — 77412 RADIATION TX DELIVERY LVL 3: CPT | Performed by: RADIOLOGY

## 2020-07-21 PROCEDURE — 77387 GUIDANCE FOR RADJ TX DLVR: CPT | Performed by: RADIOLOGY

## 2020-07-21 NOTE — PROGRESS NOTES
Phoned patient to discuss POC. She will complete RT on Thurs 7/23. Assisted in scheduling appointment with Dr. Manuel Hendricks on 7/31 for discussion of endocrine therapy. Pt will phone with any other questions or concerns.

## 2020-07-22 PROCEDURE — 77387 GUIDANCE FOR RADJ TX DLVR: CPT | Performed by: RADIOLOGY

## 2020-07-22 PROCEDURE — 77412 RADIATION TX DELIVERY LVL 3: CPT | Performed by: RADIOLOGY

## 2020-07-23 PROCEDURE — 77336 RADIATION PHYSICS CONSULT: CPT | Performed by: RADIOLOGY

## 2020-07-23 PROCEDURE — 77412 RADIATION TX DELIVERY LVL 3: CPT | Performed by: RADIOLOGY

## 2020-07-23 PROCEDURE — 77387 GUIDANCE FOR RADJ TX DLVR: CPT | Performed by: RADIOLOGY

## 2020-07-23 NOTE — ADDENDUM NOTE
Encounter addended by:  Zachariah Shah RN on: 7/23/2020 7:54 AM   Actions taken: Clinical Note Signed

## 2020-07-23 NOTE — PROGRESS NOTES
RADIATION ONCOLOGY COMPLETION SUMMARY NOTE    DIAGNOSIS :  Stage IA (pT1c, pN0, cM0, G3, ER+, [place]+, HER2-) , s/p lumpectomy and SNLB and TC chemotherapy, now s/p adjuvant XRT on 7/23/2020.   Dear Drs Clifton Holter and colleagues  As you recall, pt is a 45 hormonal blockade with Dr. El Tomlinson and Sofia Shone long term. Thank you very much for allowing us to take care of your patient. Robbie Dickey MD, [email or web address]  [phone]      Chemotherapy        Some values may be hidde

## 2020-07-27 NOTE — PROGRESS NOTES
[facility] Progress Note      Patient Name: Lee García   YOB: 1975  Medical Record Number: [id number]  Attending Physician: Arlin Dick M.D.      Date of Visit: 7/31/2020      Chief Complaint  Invasive ductal carci STK11, and TP53    Patient was premenopausal at diagnosis. Patient started adjuvant chemotherapy with docetaxel and cyclophosphamide on 03/19/2020. Cycle 1 was complicated by transient fatigue, diffuse joint aches, and diarrhea.  Cycle 2 was started on Ms. Ion Cisneros is allergic to adhesive tape (rosins). Review of Systems   Constitutional      No fevers, chills, night sweats, excessive fatigue. Breasts   No self palpated masses; no nipple discharge.    Respiratory          No dyspnea, cough, hemoptys tamoxifen within the next two weeks; prescription sent to pharmacy. Bilateral mammography due in 11/2020; order provided to patient. 2.   Birth control: Patient still has Mirena IUD in place.  Although there is no definitive data to suggest marcela

## 2020-07-31 ENCOUNTER — OFFICE VISIT (OUTPATIENT)
Dept: HEMATOLOGY/ONCOLOGY | Facility: HOSPITAL | Age: 45
End: 2020-07-31
Attending: GENETIC COUNSELOR, MS
Payer: COMMERCIAL

## 2020-07-31 VITALS
RESPIRATION RATE: 18 BRPM | OXYGEN SATURATION: 100 % | BODY MASS INDEX: 33.76 KG/M2 | SYSTOLIC BLOOD PRESSURE: 127 MMHG | WEIGHT: 202.63 LBS | DIASTOLIC BLOOD PRESSURE: 84 MMHG | HEIGHT: 64.84 IN | TEMPERATURE: 98 F | HEART RATE: 62 BPM

## 2020-07-31 DIAGNOSIS — Z97.5 IUD (INTRAUTERINE DEVICE) IN PLACE: ICD-10-CM

## 2020-07-31 DIAGNOSIS — D64.81 ANEMIA ASSOCIATED WITH CHEMOTHERAPY: ICD-10-CM

## 2020-07-31 DIAGNOSIS — C50.912 INVASIVE DUCTAL CARCINOMA OF BREAST, FEMALE, LEFT (HCC): Primary | ICD-10-CM

## 2020-07-31 DIAGNOSIS — T45.1X5A ANEMIA ASSOCIATED WITH CHEMOTHERAPY: ICD-10-CM

## 2020-07-31 PROCEDURE — 99215 OFFICE O/P EST HI 40 MIN: CPT | Performed by: SPECIALIST

## 2020-07-31 RX ORDER — TAMOXIFEN CITRATE 20 MG/1
20 TABLET ORAL DAILY
Qty: 90 TABLET | Refills: 1 | Status: SHIPPED | OUTPATIENT
Start: 2020-07-31 | End: 2020-10-28

## 2020-07-31 NOTE — PROGRESS NOTES
Patient is here today for follow up with Sarah Esteves for Breast Cancer. Completed Radiation Therapy on July 23,2020. Patient denies pain. Stated she is feeling good. Medication list and medical history were reviewed and updated.      Education Record    Abhi Urbano

## 2020-08-05 ENCOUNTER — NURSE NAVIGATOR ENCOUNTER (OUTPATIENT)
Dept: HEMATOLOGY/ONCOLOGY | Facility: HOSPITAL | Age: 45
End: 2020-08-05

## 2020-08-05 NOTE — PROGRESS NOTES
Spoke with patient regarding upcoming IUD removal. Per Dr. Harkins Nicely, date of 9/8 is sufficient. Pt also wondering if she may swap out for the copper IUD, per MD this is also okay.  Encouraged patient to discuss with OB office regarding insurance coverage, an

## 2020-09-08 ENCOUNTER — OFFICE VISIT (OUTPATIENT)
Dept: OBGYN CLINIC | Facility: CLINIC | Age: 45
End: 2020-09-08
Payer: COMMERCIAL

## 2020-09-08 VITALS
BODY MASS INDEX: 33.66 KG/M2 | SYSTOLIC BLOOD PRESSURE: 118 MMHG | HEIGHT: 65 IN | WEIGHT: 202 LBS | DIASTOLIC BLOOD PRESSURE: 84 MMHG | TEMPERATURE: 98 F

## 2020-09-08 DIAGNOSIS — Z30.433 ENCOUNTER FOR REMOVAL AND REINSERTION OF IUD: Primary | ICD-10-CM

## 2020-09-08 PROCEDURE — 58301 REMOVE INTRAUTERINE DEVICE: CPT | Performed by: OBSTETRICS & GYNECOLOGY

## 2020-09-08 PROCEDURE — 3008F BODY MASS INDEX DOCD: CPT | Performed by: OBSTETRICS & GYNECOLOGY

## 2020-09-08 PROCEDURE — 3079F DIAST BP 80-89 MM HG: CPT | Performed by: OBSTETRICS & GYNECOLOGY

## 2020-09-08 PROCEDURE — 3074F SYST BP LT 130 MM HG: CPT | Performed by: OBSTETRICS & GYNECOLOGY

## 2020-09-08 PROCEDURE — 58300 INSERT INTRAUTERINE DEVICE: CPT | Performed by: OBSTETRICS & GYNECOLOGY

## 2020-09-08 RX ORDER — COPPER 313.4 MG/1
1 INTRAUTERINE DEVICE INTRAUTERINE ONCE
Status: COMPLETED | OUTPATIENT
Start: 2020-09-08 | End: 2020-09-08

## 2020-09-08 RX ADMIN — COPPER 1 DEVICE: 313.4 INTRAUTERINE DEVICE INTRAUTERINE at 11:11:00

## 2020-09-08 NOTE — PROCEDURES
Procedure Note: Mirena IUD removal and Paragard insertion     Preoperative Diagnosis:  IUD in place   Estrogen positive invasive ductal carcinoma of breast     Postoperative Diagnosis:  S/p IUD removal       Procedure:    A discussion was held with the davey verbalized understanding.      Disposition: Stable    Complications: None    Follow up: 4 weeks     Jay Edmondson MD

## 2020-09-09 NOTE — PROGRESS NOTES
[facility] Progress Note      Patient Name: Christine Garcia   YOB: 1975  Medical Record Number: [id number]  Attending Physician: Christiano Angeles M.D.      Date of Visit: 9/11/2020      Chief Complaint  Invasive ductal carci STK11, and TP53    Patient was premenopausal at diagnosis. Patient started adjuvant chemotherapy with docetaxel and cyclophosphamide on 03/19/2020. Cycle 1 was complicated by transient fatigue, diffuse joint aches, and diarrhea.  Cycle 2 was started on reviewed by physician)  Denies tobacco use. Current Medications  Tamoxifen Citrate 20 MG Oral Tab, Take 1 tablet (20 mg total) by mouth daily. , Disp: 90 tablet, Rfl: 1    [COMPLETED] Paragard Intrauterine Copper IUD 1 Device, 1 Device, Intrauterine, 14.6 11.0 - 15.0 %    RDW-SD 47.1 (H) 35.1 - 46.3 fL    Neutrophil Absolute Prelim 4.14 1.50 - 7.70 x10 (3) uL    Neutrophil Absolute 4.14 1.50 - 7.70 x10(3) uL    Lymphocyte Absolute 1.53 1.00 - 4.00 x10(3) uL    Monocyte Absolute 0.34 0.10 - 1.00 x10(3)

## 2020-09-11 ENCOUNTER — OFFICE VISIT (OUTPATIENT)
Dept: HEMATOLOGY/ONCOLOGY | Facility: HOSPITAL | Age: 45
End: 2020-09-11
Attending: GENETIC COUNSELOR, MS
Payer: COMMERCIAL

## 2020-09-11 VITALS
WEIGHT: 206.19 LBS | SYSTOLIC BLOOD PRESSURE: 131 MMHG | HEIGHT: 64.84 IN | RESPIRATION RATE: 18 BRPM | DIASTOLIC BLOOD PRESSURE: 84 MMHG | TEMPERATURE: 98 F | BODY MASS INDEX: 34.35 KG/M2 | HEART RATE: 71 BPM | OXYGEN SATURATION: 100 %

## 2020-09-11 DIAGNOSIS — T45.1X5A ANEMIA ASSOCIATED WITH CHEMOTHERAPY: Primary | ICD-10-CM

## 2020-09-11 DIAGNOSIS — D64.81 ANEMIA ASSOCIATED WITH CHEMOTHERAPY: Primary | ICD-10-CM

## 2020-09-11 DIAGNOSIS — D64.9 NORMOCYTIC NORMOCHROMIC ANEMIA: ICD-10-CM

## 2020-09-11 DIAGNOSIS — C50.912 INVASIVE DUCTAL CARCINOMA OF BREAST, FEMALE, LEFT (HCC): ICD-10-CM

## 2020-09-11 LAB
BASOPHILS # BLD AUTO: 0.03 X10(3) UL (ref 0–0.2)
BASOPHILS NFR BLD AUTO: 0.5 %
DEPRECATED RDW RBC AUTO: 47.1 FL (ref 35.1–46.3)
EOSINOPHIL # BLD AUTO: 0.07 X10(3) UL (ref 0–0.7)
EOSINOPHIL NFR BLD AUTO: 1.1 %
ERYTHROCYTE [DISTWIDTH] IN BLOOD BY AUTOMATED COUNT: 14.6 % (ref 11–15)
HCT VFR BLD AUTO: 39.8 % (ref 35–48)
HGB BLD-MCNC: 12.9 G/DL (ref 12–16)
IMM GRANULOCYTES # BLD AUTO: 0.02 X10(3) UL (ref 0–1)
IMM GRANULOCYTES NFR BLD: 0.3 %
LYMPHOCYTES # BLD AUTO: 1.53 X10(3) UL (ref 1–4)
LYMPHOCYTES NFR BLD AUTO: 25 %
MCH RBC QN AUTO: 28.7 PG (ref 26–34)
MCHC RBC AUTO-ENTMCNC: 32.4 G/DL (ref 31–37)
MCV RBC AUTO: 88.4 FL (ref 80–100)
MONOCYTES # BLD AUTO: 0.34 X10(3) UL (ref 0.1–1)
MONOCYTES NFR BLD AUTO: 5.5 %
NEUTROPHILS # BLD AUTO: 4.14 X10 (3) UL (ref 1.5–7.7)
NEUTROPHILS # BLD AUTO: 4.14 X10(3) UL (ref 1.5–7.7)
NEUTROPHILS NFR BLD AUTO: 67.6 %
PLATELET # BLD AUTO: 218 10(3)UL (ref 150–450)
RBC # BLD AUTO: 4.5 X10(6)UL (ref 3.8–5.3)
WBC # BLD AUTO: 6.1 X10(3) UL (ref 4–11)

## 2020-09-11 PROCEDURE — 99214 OFFICE O/P EST MOD 30 MIN: CPT | Performed by: SPECIALIST

## 2020-10-07 ENCOUNTER — OFFICE VISIT (OUTPATIENT)
Dept: OBGYN CLINIC | Facility: CLINIC | Age: 45
End: 2020-10-07
Payer: COMMERCIAL

## 2020-10-07 VITALS
HEIGHT: 64.84 IN | WEIGHT: 206 LBS | DIASTOLIC BLOOD PRESSURE: 76 MMHG | SYSTOLIC BLOOD PRESSURE: 120 MMHG | BODY MASS INDEX: 34.32 KG/M2

## 2020-10-07 DIAGNOSIS — Z30.431 IUD CHECK UP: Primary | ICD-10-CM

## 2020-10-07 PROCEDURE — 3078F DIAST BP <80 MM HG: CPT | Performed by: OBSTETRICS & GYNECOLOGY

## 2020-10-07 PROCEDURE — 99212 OFFICE O/P EST SF 10 MIN: CPT | Performed by: OBSTETRICS & GYNECOLOGY

## 2020-10-07 PROCEDURE — 3074F SYST BP LT 130 MM HG: CPT | Performed by: OBSTETRICS & GYNECOLOGY

## 2020-10-07 PROCEDURE — 3008F BODY MASS INDEX DOCD: CPT | Performed by: OBSTETRICS & GYNECOLOGY

## 2020-10-07 NOTE — PROGRESS NOTES
Patient presents with: Follow - Up: 4 week IUD follow up after Paragard Insertion     Doing well. Had one period. Normal flow and mild cramps. /76   Ht 64.84\"   Wt 206 lb (93.4 kg)   LMP 09/09/2020   BMI 34.45 kg/m²   Nl ext genitalia. Cervix nl.

## 2020-10-26 ENCOUNTER — TELEPHONE (OUTPATIENT)
Dept: HEMATOLOGY/ONCOLOGY | Facility: HOSPITAL | Age: 45
End: 2020-10-26

## 2020-10-28 ENCOUNTER — TELEPHONE (OUTPATIENT)
Dept: HEMATOLOGY/ONCOLOGY | Facility: HOSPITAL | Age: 45
End: 2020-10-28

## 2020-10-28 RX ORDER — TAMOXIFEN CITRATE 20 MG/1
20 TABLET ORAL DAILY
Qty: 90 TABLET | Refills: 0 | Status: SHIPPED | OUTPATIENT
Start: 2020-10-28 | End: 2021-10-26

## 2020-10-28 NOTE — TELEPHONE ENCOUNTER
1. Has a note to eat prior to her follow up appt- was this something from before that got carried over in her notes or does she need to eat prior to this appointment? 2. [facility] sent over a request fr Tamoxifin approval- has that been sent?  If so can c

## 2020-10-28 NOTE — TELEPHONE ENCOUNTER
Patient does not have to eat prior to her appointment with Tanisha Barrientos. Patient requesting prescription for Tamoxifen be sent to [facility]. Sent as requested. Cancelled [facility] script for Tamoxifen.

## 2020-11-04 ENCOUNTER — HOSPITAL ENCOUNTER (OUTPATIENT)
Dept: MAMMOGRAPHY | Facility: HOSPITAL | Age: 45
Discharge: HOME | End: 2020-11-04
Attending: SPECIALIST
Payer: COMMERCIAL

## 2020-11-04 DIAGNOSIS — C50.912 INVASIVE DUCTAL CARCINOMA OF BREAST, FEMALE, LEFT (HCC): ICD-10-CM

## 2020-11-04 PROCEDURE — 77062 BREAST TOMOSYNTHESIS BI: CPT | Performed by: SPECIALIST

## 2020-11-04 PROCEDURE — 77066 DX MAMMO INCL CAD BI: CPT | Performed by: SPECIALIST

## 2020-12-07 NOTE — PROGRESS NOTES
THE [facility] Progress Note      Patient Name: Stefanie Carreon   YOB: 1975  Medical Record Number: [id number]  Attending Physician: Abisai You M.D.      Date of Visit: 12/8/2020      Chief Complaint  Invasive ductal carci STK11, and TP53    Patient was premenopausal at diagnosis. Patient started adjuvant chemotherapy with docetaxel and cyclophosphamide on 03/19/2020. Cycle 1 was complicated by transient fatigue, diffuse joint aches, and diarrhea.  Cycle 2 was started on Dallin’s father is an only child. Ms. Bessy Mcguire has two brothers and no sisters. Social History (historical data, reviewed by physician)  Denies tobacco use.        Current Medications    •  Tamoxifen Citrate 20 MG Oral Tab, Take 1 tablet (20 mg total) by DANE MIRNA 2D+3D DIAGNOSTIC ADDL VWS LEFT (IDW=73568/33121), 12/16/2019, 9:17 AM.  [facility] , DANE YE BIOPSY STEREO W/CALC 2 SITE LEFT   (CPT=19081/05249), 1/07/2020, 1:00 PM.  [facility], DANE LOCALIZATION WIRE 1 SITE LEFT (CPT=19281), 2/14/2020, 9:42 AM. tumor is hormone receptor positive, I recommend at least 5 years of adjuvant endocrine therapy. Continue tamoxifen without modification. Left sided mammogram due in 05/2021; order provided to patient.      2.   Lymphedema: Patient has lymphedema a

## 2020-12-08 ENCOUNTER — OFFICE VISIT (OUTPATIENT)
Dept: HEMATOLOGY/ONCOLOGY | Facility: HOSPITAL | Age: 45
End: 2020-12-08
Attending: GENETIC COUNSELOR, MS
Payer: COMMERCIAL

## 2020-12-08 DIAGNOSIS — C50.912 INVASIVE DUCTAL CARCINOMA OF BREAST, FEMALE, LEFT (HCC): Primary | ICD-10-CM

## 2020-12-08 DIAGNOSIS — I89.0 LYMPHEDEMA OF LEFT ARM: ICD-10-CM

## 2020-12-08 PROCEDURE — 99215 OFFICE O/P EST HI 40 MIN: CPT | Performed by: SPECIALIST

## 2020-12-08 NOTE — PROGRESS NOTES
Patient is here today for follow up  with Tanisha Barrientos for Breast Cancer. Currently on Tamoxifen therapy. Patient stated discomfort Left axilla and surgical side of her breast. Stated hot flashes noted at night.   Medication list and medical history were revi

## 2020-12-15 ENCOUNTER — APPOINTMENT (OUTPATIENT)
Dept: HEMATOLOGY/ONCOLOGY | Facility: HOSPITAL | Age: 45
End: 2020-12-15
Attending: SPECIALIST
Payer: COMMERCIAL

## 2021-01-25 ENCOUNTER — TELEPHONE (OUTPATIENT)
Dept: PHYSICAL THERAPY | Facility: HOSPITAL | Age: 46
End: 2021-01-25

## 2021-01-27 ENCOUNTER — OFFICE VISIT (OUTPATIENT)
Dept: OCCUPATIONAL MEDICINE | Facility: HOSPITAL | Age: 46
End: 2021-01-27
Attending: SPECIALIST
Payer: COMMERCIAL

## 2021-01-27 DIAGNOSIS — I89.0 LYMPHEDEMA OF LEFT ARM: ICD-10-CM

## 2021-01-27 PROCEDURE — 97165 OT EVAL LOW COMPLEX 30 MIN: CPT

## 2021-01-27 NOTE — PROGRESS NOTES
UE LYMPHEDEMA EVALUATION:   Referring Physician: Dr. Chauncey Bishop  Diagnosis: Lymphedema of left arm (I89.0) Date of Service: 1/27/2021     PATIENT SUMMARY   Octaviano Blackwood is a 39year old female who presents to therapy today;L hand becomes tingly  Still h H/O L Breast cancer. ASSESSMENT   Boby Mendez presents to Occupational Therapy evaluation with functional LUE AROM tho L upper arm/axilla area swelling results in poor quality of sleep and functional OH reaching.  LUE fluid volume measures 10 cm greater than RU AB-D       170      170    COMPRESSION: Presently does not own/wear any. Was discussed and will need in future.     Lymphedema Life Impact Scale: Score: not taken      Today’s Treatment:  Initial evaluation      Charges: Judithe Meckel of care. Thank you for your referral. Please co-sign or sign and return this letter via fax as soon as possible to [phone].  If you have any questions, please contact me at Dept: [phone]    Sincerely,  Electronically signed by therapist: Leticia Xie

## 2021-02-02 ENCOUNTER — OFFICE VISIT (OUTPATIENT)
Dept: OCCUPATIONAL MEDICINE | Facility: HOSPITAL | Age: 46
End: 2021-02-02
Attending: SPECIALIST
Payer: COMMERCIAL

## 2021-02-02 DIAGNOSIS — I89.0 LYMPHEDEMA OF LEFT ARM: ICD-10-CM

## 2021-02-02 PROCEDURE — 97140 MANUAL THERAPY 1/> REGIONS: CPT

## 2021-02-02 NOTE — PROGRESS NOTES
Dx: Lymphedema of left arm        Insurance (Authorized # of Visits):2/16    Next MD/Plan Renewal Date:    (4/27/21 )  Authorizing Physician: Dr. Leander Kramer Risk:low       Precautions:   L breast Cancer       Subjective:   Pt reports:  * still feeling

## 2021-02-04 ENCOUNTER — OFFICE VISIT (OUTPATIENT)
Dept: OCCUPATIONAL MEDICINE | Facility: HOSPITAL | Age: 46
End: 2021-02-04
Attending: FAMILY MEDICINE
Payer: COMMERCIAL

## 2021-02-04 PROCEDURE — 97140 MANUAL THERAPY 1/> REGIONS: CPT

## 2021-02-04 NOTE — PROGRESS NOTES
Dx: Lymphedema of left arm (I89.0)        Insurance (Authorized # of Visits): 3/ 16    Next MD/Plan Renewal Date:   4/27/21   Authorizing Physician: Ivan Cornelius   Fall Risk:none        Precautions: L Breast Cancer       Subjective:   Pt Reports:  *  Was a l

## 2021-02-08 ENCOUNTER — OFFICE VISIT (OUTPATIENT)
Dept: OCCUPATIONAL MEDICINE | Facility: HOSPITAL | Age: 46
End: 2021-02-08
Attending: FAMILY MEDICINE
Payer: COMMERCIAL

## 2021-02-08 PROCEDURE — 97140 MANUAL THERAPY 1/> REGIONS: CPT

## 2021-02-08 NOTE — PROGRESS NOTES
Dx: Lymphedema of left arm (I89.0)        Insurance (Authorized # of Visits): 4/ 16    Next MD/Plan Renewal Date:   4/27/21   Authorizing Physician: Zeke Casiano   Fall Risk:none        Precautions: L Breast Cancer       Subjective:   Pt Reports:  *  Gisel madden

## 2021-02-10 ENCOUNTER — OFFICE VISIT (OUTPATIENT)
Dept: OCCUPATIONAL MEDICINE | Facility: HOSPITAL | Age: 46
End: 2021-02-10
Attending: FAMILY MEDICINE
Payer: COMMERCIAL

## 2021-02-10 PROCEDURE — 97140 MANUAL THERAPY 1/> REGIONS: CPT

## 2021-02-10 NOTE — PROGRESS NOTES
Dx: Lymphedema of left arm (I89.0)        Insurance (Authorized # of Visits): 4/ 16    Next MD/Plan Renewal Date:   4/27/21   Authorizing Physician: Alex Grullon   Fall Risk:none        Precautions: L Breast Cancer       Subjective:   Pt Reports:  * WESTLEY ROM

## 2021-02-15 ENCOUNTER — OFFICE VISIT (OUTPATIENT)
Dept: OCCUPATIONAL MEDICINE | Facility: HOSPITAL | Age: 46
End: 2021-02-15
Attending: FAMILY MEDICINE
Payer: COMMERCIAL

## 2021-02-15 PROCEDURE — 97140 MANUAL THERAPY 1/> REGIONS: CPT

## 2021-02-16 NOTE — PROGRESS NOTES
Dx: Lymphedema of left arm (I89.0)        Insurance (Authorized # of Visits): 6/ 16    Next MD/Plan Renewal Date:   4/27/21   Authorizing Physician: Michael Sotelo   Fall Risk:none        Precautions: L Breast Cancer       Subjective:   Pt Reports:  * WESTLEY ROM comfortably and to reduce pt's infection risk. 16 sessions  6-  Pt will be independent in use of compression garments, self-manual lymph drainage, decongestive exercises and lymphedema precautions LUE for life-long self-management of lymphedema.  16 session

## 2021-02-17 ENCOUNTER — OFFICE VISIT (OUTPATIENT)
Dept: OCCUPATIONAL MEDICINE | Facility: HOSPITAL | Age: 46
End: 2021-02-17
Attending: FAMILY MEDICINE
Payer: COMMERCIAL

## 2021-02-17 PROCEDURE — 97140 MANUAL THERAPY 1/> REGIONS: CPT

## 2021-02-17 NOTE — PROGRESS NOTES
Dx: Lymphedema of left arm (I89.0)        Insurance (Authorized # of Visits): 7/ 16    Next MD/Plan Renewal Date:   4/27/21   Authorizing Physician: Isra Keane   Fall Risk:none        Precautions: L Breast Cancer       Subjective:   Pt Reports:  * had to r UE lymphedema volume by 10cm and improve tissue quality to soft and mobile to allow pt to reach [facility] and sleep comfortably and to reduce pt's infection risk.  16 sessions  6-  Pt will be independent in use of compression garments, self-manual lymph drainage,

## 2021-02-22 ENCOUNTER — TELEPHONE (OUTPATIENT)
Dept: PHYSICAL THERAPY | Facility: HOSPITAL | Age: 46
End: 2021-02-22

## 2021-02-22 ENCOUNTER — APPOINTMENT (OUTPATIENT)
Dept: OCCUPATIONAL MEDICINE | Facility: HOSPITAL | Age: 46
End: 2021-02-22
Attending: FAMILY MEDICINE
Payer: COMMERCIAL

## 2021-02-23 ENCOUNTER — OFFICE VISIT (OUTPATIENT)
Dept: OCCUPATIONAL MEDICINE | Facility: HOSPITAL | Age: 46
End: 2021-02-23
Attending: FAMILY MEDICINE
Payer: COMMERCIAL

## 2021-02-23 PROCEDURE — 97140 MANUAL THERAPY 1/> REGIONS: CPT

## 2021-02-23 NOTE — PROGRESS NOTES
Dx: Lymphedema of left arm (I89.0)        Insurance (Authorized # of Visits): 8/ 16    Next MD/Plan Renewal Date:   4/27/21   Authorizing Physician: Katy Paz   Fall Risk:none        Precautions: L Breast Cancer       Subjective:   Pt Reports:  * good L a use of compression garments, self-manual lymph drainage, decongestive exercises and lymphedema precautions LUE for life-long self-management of lymphedema.  16 sessions    Plan:   Continue per OT POC      Charges: 4x MT  Total Timed Treatment: 60 min  Total

## 2021-04-12 ENCOUNTER — APPOINTMENT (OUTPATIENT)
Dept: OCCUPATIONAL MEDICINE | Facility: HOSPITAL | Age: 46
End: 2021-04-12
Attending: SPECIALIST
Payer: COMMERCIAL

## 2021-04-14 ENCOUNTER — OFFICE VISIT (OUTPATIENT)
Dept: OCCUPATIONAL MEDICINE | Facility: HOSPITAL | Age: 46
End: 2021-04-14
Attending: SPECIALIST
Payer: COMMERCIAL

## 2021-04-14 PROCEDURE — 97140 MANUAL THERAPY 1/> REGIONS: CPT

## 2021-04-14 NOTE — PROGRESS NOTES
Dx: Lymphedema of left arm (I89.0)        Insurance (Authorized # of Visits): 9/ 16    Next MD/Plan Renewal Date:   4/27/21   Authorizing Physician: Portia Coleman   Fall Risk:none        Precautions: L Breast Cancer       PROGRESS Summary  Pt has attended 9, Circumferential Measurements (cm)    LEFT    RIGHT   Axilla 41.0 38.0   15cm above elbow crease 40.4 36.0   10cm above elbow crease 38.8 35.0   5cm above elbow crease 35.0 33.0   Elbow crease 29.0 28.0          20cm above ulnar styloid    29.5    29.0   15 MET/ONGOING. 24 visits  6-  Pt will be independent in use of compression garments, self-manual lymph drainage, decongestive exercises and lymphedema precautions LUE for life-long self-management of lymphedema.  24 sessions  7-  NEW: 4/14/21 : increase OT Tx

## 2021-04-19 ENCOUNTER — OFFICE VISIT (OUTPATIENT)
Dept: OCCUPATIONAL MEDICINE | Facility: HOSPITAL | Age: 46
End: 2021-04-19
Attending: SPECIALIST
Payer: COMMERCIAL

## 2021-04-19 PROCEDURE — 97140 MANUAL THERAPY 1/> REGIONS: CPT

## 2021-04-19 NOTE — PROGRESS NOTES
Dx: Lymphedema of left arm (I89.0)        Insurance (Authorized # of Visits): 11/ 24 (adjusted)    Next MD/Plan Renewal Date:   7/17/21   Authorizing Physician: Alex Grullon   Fall Risk:none        Precautions: L Breast Cancer       Subjective:   Pt Reports: 16 sessions NOT MET/ONGOING. 24 visits  6-  Pt will be independent in use of compression garments, self-manual lymph drainage, decongestive exercises and lymphedema precautions LUE for life-long self-management of lymphedema.  24 sessions  7-  NEW: 4/14/21

## 2021-04-21 ENCOUNTER — OFFICE VISIT (OUTPATIENT)
Dept: OCCUPATIONAL MEDICINE | Facility: HOSPITAL | Age: 46
End: 2021-04-21
Attending: SPECIALIST
Payer: COMMERCIAL

## 2021-04-21 PROCEDURE — 97140 MANUAL THERAPY 1/> REGIONS: CPT

## 2021-04-21 NOTE — PROGRESS NOTES
Dx: Lymphedema of left arm (I89.0)        Insurance (Authorized # of Visits): 11/ 24 (adjusted)    Next MD/Plan Renewal Date:   7/17/21   Authorizing Physician: Jarrell Negron   Fall Risk:none        Precautions: L Breast Cancer       Subjective:   Pt Reports: visits  6-  Pt will be independent in use of compression garments, self-manual lymph drainage, decongestive exercises and lymphedema precautions LUE for life-long self-management of lymphedema.  24 sessions  7-  NEW: 4/14/21 : increase OT Tx sessions to 24

## 2021-04-26 ENCOUNTER — OFFICE VISIT (OUTPATIENT)
Dept: OCCUPATIONAL MEDICINE | Facility: HOSPITAL | Age: 46
End: 2021-04-26
Attending: SPECIALIST
Payer: COMMERCIAL

## 2021-04-26 PROCEDURE — 97140 MANUAL THERAPY 1/> REGIONS: CPT

## 2021-04-26 NOTE — PROGRESS NOTES
Dx: Lymphedema of left arm (I89.0)        Insurance (Authorized # of Visits): 12/ 24 (adjusted)    Next MD/Plan Renewal Date:   7/17/21   Authorizing Physician: Isra Keane   Fall Risk:none        Precautions: L Breast Cancer       Subjective:   Pt Reports: OH and sleep comfortably and to reduce pt's infection risk. 16 sessions NOT MET/ONGOING.  24 visits  6-  Pt will be independent in use of compression garments, self-manual lymph drainage, decongestive exercises and lymphedema precautions LUE for life-long s

## 2021-04-28 ENCOUNTER — OFFICE VISIT (OUTPATIENT)
Dept: OCCUPATIONAL MEDICINE | Facility: HOSPITAL | Age: 46
End: 2021-04-28
Attending: SPECIALIST
Payer: COMMERCIAL

## 2021-04-28 PROCEDURE — 97140 MANUAL THERAPY 1/> REGIONS: CPT

## 2021-04-28 NOTE — PROGRESS NOTES
Dx: Lymphedema of left arm (I89.0)        Insurance (Authorized # of Visits): 13/ 24 (adjusted)    Next MD/Plan Renewal Date:   7/17/21   Authorizing Physician: Ed Montelongo   Fall Risk:none        Precautions: L Breast Cancer       Subjective:   Pt Reports: garments, self-manual lymph drainage, decongestive exercises and lymphedema precautions LUE for life-long self-management of lymphedema. 24 sessions  7-  NEW: 4/14/21 : increase OT Tx sessions to 24    Plan:   Continue per OT POC.   * schedule  compression

## 2021-04-30 ENCOUNTER — HOSPITAL ENCOUNTER (OUTPATIENT)
Dept: MAMMOGRAPHY | Facility: HOSPITAL | Age: 46
Discharge: HOME | End: 2021-04-30
Attending: SPECIALIST
Payer: COMMERCIAL

## 2021-04-30 DIAGNOSIS — C50.912 INVASIVE DUCTAL CARCINOMA OF BREAST, FEMALE, LEFT (HCC): ICD-10-CM

## 2021-04-30 PROCEDURE — 77065 DX MAMMO INCL CAD UNI: CPT | Performed by: SPECIALIST

## 2021-04-30 PROCEDURE — 77061 BREAST TOMOSYNTHESIS UNI: CPT | Performed by: SPECIALIST

## 2021-05-05 ENCOUNTER — OFFICE VISIT (OUTPATIENT)
Dept: OCCUPATIONAL MEDICINE | Facility: HOSPITAL | Age: 46
End: 2021-05-05
Attending: SPECIALIST
Payer: COMMERCIAL

## 2021-05-05 PROCEDURE — 97140 MANUAL THERAPY 1/> REGIONS: CPT

## 2021-05-05 NOTE — PROGRESS NOTES
Dx: Lymphedema of left arm (I89.0)        Insurance (Authorized # of Visits): 13/ 24 (adjusted)    Next MD/Plan Renewal Date:   7/17/21   Authorizing Physician: Stiven Branch   Fall Risk:none        Precautions: L Breast Cancer       Subjective:   Pt Reports: risk. 16 sessions NOT MET/ONGOING. 24 visits  6-  Pt will be independent in use of compression garments, self-manual lymph drainage, decongestive exercises and lymphedema precautions LUE for life-long self-management of lymphedema.  24 sessions  7-  NEW: 4/

## 2021-05-13 ENCOUNTER — TELEPHONE (OUTPATIENT)
Dept: HEMATOLOGY/ONCOLOGY | Facility: HOSPITAL | Age: 46
End: 2021-05-13

## 2021-05-13 NOTE — TELEPHONE ENCOUNTER
MD Renan Arshad, RN  Caller: Unspecified (Today,  9:47 AM)  Let her know that I signed the order earlier this week.             Patient notified

## 2021-05-13 NOTE — TELEPHONE ENCOUNTER
Sherri martinez from [facility] to ask If  received order for flextouch.  Thank you tamar

## 2021-06-30 ENCOUNTER — OFFICE VISIT (OUTPATIENT)
Dept: OCCUPATIONAL MEDICINE | Facility: HOSPITAL | Age: 46
End: 2021-06-30
Attending: FAMILY MEDICINE
Payer: COMMERCIAL

## 2021-06-30 PROCEDURE — 97140 MANUAL THERAPY 1/> REGIONS: CPT

## 2021-06-30 NOTE — PROGRESS NOTES
Dx: Lymphedema of left arm (I89.0)        Insurance (Authorized # of Visits): 71/ 91 (adjusted)    Next MD/Plan Renewal Date:   7/17/21   Authorizing Physician: Mirian Sacks   Fall Risk:none        Precautions: L Breast Cancer       PROGRESS Summary  Pt has L axilla. L Ulnar nerve flossing  Discussion re: wearing schedule of compression sleeve/vest, use of pump. COMPRESSION:  Applies size F tensogrip to LUE.     Has but reports to not wearing Elvarex CCL 2 compression sleeve and vest   * Has and periodically self-management of lymphedema. 30 sessions  7-  NEW: 4/14/21 : increase OT Tx sessions to 24  8- NEW: 6/31/21: increase OT visits to 30 to complete all goals.     Rehab Potential: good    Plan: Continue skilled Occupational Therapy 2-3 x/week or a total of 3

## 2021-07-07 ENCOUNTER — APPOINTMENT (OUTPATIENT)
Dept: OCCUPATIONAL MEDICINE | Facility: HOSPITAL | Age: 46
End: 2021-07-07
Payer: COMMERCIAL

## 2021-07-09 ENCOUNTER — OFFICE VISIT (OUTPATIENT)
Dept: OCCUPATIONAL MEDICINE | Facility: HOSPITAL | Age: 46
End: 2021-07-09
Attending: FAMILY MEDICINE
Payer: COMMERCIAL

## 2021-07-09 PROCEDURE — 97140 MANUAL THERAPY 1/> REGIONS: CPT

## 2021-07-09 NOTE — PROGRESS NOTES
Dx: Lymphedema of left arm (I89.0)        Insurance (Authorized # of Visits): 16/ 30 (adjusted)    Next MD/Plan Renewal Date: 10/3/21   Authorizing Physician: Rick Rodriguez   Fall Risk:none        Precautions: L Breast Cancer     Subjective:   Pt Reports:  *s by 10-15 cm (adjusted) and improve tissue quality to soft and mobile to allow pt to reach [facility] and sleep comfortably and to reduce pt's infection risk. 30 sessions (adjusted) NOT MET/ONGOING.   6-  Pt will be independent in use of compression garments, self-m

## 2021-07-15 ENCOUNTER — OFFICE VISIT (OUTPATIENT)
Dept: OCCUPATIONAL MEDICINE | Facility: HOSPITAL | Age: 46
End: 2021-07-15
Attending: FAMILY MEDICINE
Payer: COMMERCIAL

## 2021-07-15 PROCEDURE — 97140 MANUAL THERAPY 1/> REGIONS: CPT

## 2021-07-15 NOTE — PROGRESS NOTES
Dx: Lymphedema of left arm (I89.0)        Insurance (Authorized # of Visits): 16/ 30 (adjusted)    Next MD/Plan Renewal Date: 10/3/21   Authorizing Physician: Figueroa Jensen   Fall Risk:none        Precautions: L Breast Cancer     Subjective:   Pt Reports:  *h quality to soft and mobile to allow pt to reach [facility] and sleep comfortably and to reduce pt's infection risk. 30 sessions (adjusted) NOT MET/ONGOING.   6-  Pt will be independent in use of compression garments, self-manual lymph drainage, decongestive exercis

## 2021-07-21 ENCOUNTER — OFFICE VISIT (OUTPATIENT)
Dept: OCCUPATIONAL MEDICINE | Facility: HOSPITAL | Age: 46
End: 2021-07-21
Attending: FAMILY MEDICINE
Payer: COMMERCIAL

## 2021-07-21 PROCEDURE — 97140 MANUAL THERAPY 1/> REGIONS: CPT

## 2021-07-21 NOTE — PROGRESS NOTES
Dx: Lymphedema of left arm (I89.0)        Insurance (Authorized # of Visits): 17/ 30 (adjusted)    Next MD/Plan Renewal Date: 10/3/21   Authorizing Physician: Alex Grullon   Fall Risk:none        Precautions: L Breast Cancer     Subjective:   Pt Reports:  * visits  4-  Reduce L inner elbow abnormal vessel tissue to WNL density to improve pain free LUE  reaching tasks and reduce infection risk. 16 sessions IMPROVING/ONGOING 30 visits.   5-  Reduce L UE lymphedema volume by 10-15 cm (adjusted) and improve tissue

## 2021-07-29 ENCOUNTER — APPOINTMENT (OUTPATIENT)
Dept: OCCUPATIONAL MEDICINE | Facility: HOSPITAL | Age: 46
End: 2021-07-29
Payer: COMMERCIAL

## 2021-08-16 ENCOUNTER — TELEPHONE (OUTPATIENT)
Dept: PHYSICAL THERAPY | Facility: HOSPITAL | Age: 46
End: 2021-08-16

## 2021-10-13 ENCOUNTER — TELEPHONE (OUTPATIENT)
Dept: HEMATOLOGY/ONCOLOGY | Facility: HOSPITAL | Age: 46
End: 2021-10-13

## 2021-10-13 NOTE — TELEPHONE ENCOUNTER
Spoke with patient. Instructed she was due for follow up with Zeke Casiano in March 2021. Notified her last refill from us for Tamoxifen was a 90day supply in 10/2020. Patient stated she has not really been taking the Tamoxifen.   Instructed patient to sche

## 2021-10-25 NOTE — PROGRESS NOTES
THE [facility] Progress Note      Patient Name: Estefania Schmitt   YOB: 1975  Medical Record Number: [id number]  Attending Physician: Sun Bonds M.D.      Date of Visit: 10/26/2021      Chief Complaint  Invasive ductal carc STK11, and TP53    Patient was premenopausal at diagnosis. Patient started adjuvant chemotherapy with docetaxel and cyclophosphamide on 03/19/2020. Cycle 1 was complicated by transient fatigue, diffuse joint aches, and diarrhea.  Cycle 2 was started on but a copy of her report is not available and no details regarding what testing was performed are known at this time. Ms. Reynold Sauceda father is an only child. Ms. Sybil Benitez has two brothers and no sisters.       Social History (historical data, reviewed by physic obtained for the purpose of Tomosynthesis evaluation.  The images were reconstructed and reviewed on the dedicated Tomosynthesis workstation.       BREAST COMPOSITION:   Scattered areas fibroglandular density.       FINDINGS:  Postsurgical parenchymal lim

## 2021-10-26 ENCOUNTER — OFFICE VISIT (OUTPATIENT)
Dept: HEMATOLOGY/ONCOLOGY | Facility: HOSPITAL | Age: 46
End: 2021-10-26
Attending: SPECIALIST
Payer: COMMERCIAL

## 2021-10-26 VITALS
BODY MASS INDEX: 37 KG/M2 | SYSTOLIC BLOOD PRESSURE: 134 MMHG | TEMPERATURE: 98 F | DIASTOLIC BLOOD PRESSURE: 91 MMHG | WEIGHT: 219.38 LBS | OXYGEN SATURATION: 100 % | RESPIRATION RATE: 18 BRPM | HEART RATE: 82 BPM

## 2021-10-26 DIAGNOSIS — Z79.810 LONG-TERM CURRENT USE OF TAMOXIFEN: ICD-10-CM

## 2021-10-26 DIAGNOSIS — R92.8 ABNORMAL MAMMOGRAM: Primary | ICD-10-CM

## 2021-10-26 DIAGNOSIS — C50.912 INVASIVE DUCTAL CARCINOMA OF BREAST, FEMALE, LEFT (HCC): ICD-10-CM

## 2021-10-26 PROCEDURE — 99214 OFFICE O/P EST MOD 30 MIN: CPT | Performed by: SPECIALIST

## 2021-10-26 RX ORDER — TAMOXIFEN CITRATE 20 MG/1
20 TABLET ORAL DAILY
Qty: 90 TABLET | Refills: 1 | Status: SHIPPED | OUTPATIENT
Start: 2021-10-26

## 2021-10-26 NOTE — PROGRESS NOTES
Patient is here today for follow up with Elenita Pitts for breast cancer. Last appointment was December 2020. Stated has not been taking Tamoxifen - just stopped - denied adverse side effect from the medication. Patient denies pain.  Medication list and medica

## 2021-11-10 ENCOUNTER — HOSPITAL ENCOUNTER (OUTPATIENT)
Dept: MAMMOGRAPHY | Facility: HOSPITAL | Age: 46
Discharge: HOME | End: 2021-11-10
Attending: SPECIALIST
Payer: COMMERCIAL

## 2021-11-10 DIAGNOSIS — C50.912 INVASIVE DUCTAL CARCINOMA OF BREAST, FEMALE, LEFT (HCC): ICD-10-CM

## 2021-11-10 DIAGNOSIS — R92.8 ABNORMAL MAMMOGRAM: ICD-10-CM

## 2021-11-10 PROCEDURE — 77066 DX MAMMO INCL CAD BI: CPT | Performed by: SPECIALIST

## 2021-11-10 PROCEDURE — 77062 BREAST TOMOSYNTHESIS BI: CPT | Performed by: SPECIALIST

## 2022-04-18 ENCOUNTER — HOSPITAL ENCOUNTER (OUTPATIENT)
Dept: CT IMAGING | Facility: HOSPITAL | Age: 47
Discharge: HOME | End: 2022-04-18
Attending: FAMILY MEDICINE

## 2022-04-18 ENCOUNTER — ORDER TRANSCRIPTION (OUTPATIENT)
Dept: ADMINISTRATIVE | Facility: HOSPITAL | Age: 47
End: 2022-04-18

## 2022-04-18 DIAGNOSIS — Z13.6 SCREENING FOR CARDIOVASCULAR CONDITION: ICD-10-CM

## 2022-04-18 DIAGNOSIS — Z13.9 SCREENING PROCEDURE: ICD-10-CM

## 2022-04-26 ENCOUNTER — OFFICE VISIT (OUTPATIENT)
Dept: HEMATOLOGY/ONCOLOGY | Facility: HOSPITAL | Age: 47
End: 2022-04-26
Attending: SPECIALIST
Payer: COMMERCIAL

## 2022-05-06 ENCOUNTER — OFFICE VISIT (OUTPATIENT)
Dept: HEMATOLOGY/ONCOLOGY | Facility: HOSPITAL | Age: 47
End: 2022-05-06
Attending: SPECIALIST
Payer: COMMERCIAL

## 2022-05-06 ENCOUNTER — TELEPHONE (OUTPATIENT)
Dept: HEMATOLOGY/ONCOLOGY | Facility: HOSPITAL | Age: 47
End: 2022-05-06

## 2022-05-06 VITALS
HEIGHT: 64.84 IN | SYSTOLIC BLOOD PRESSURE: 130 MMHG | WEIGHT: 227 LBS | OXYGEN SATURATION: 100 % | TEMPERATURE: 97 F | RESPIRATION RATE: 16 BRPM | BODY MASS INDEX: 37.82 KG/M2 | DIASTOLIC BLOOD PRESSURE: 89 MMHG | HEART RATE: 70 BPM

## 2022-05-06 DIAGNOSIS — C50.912 INVASIVE DUCTAL CARCINOMA OF BREAST, FEMALE, LEFT (HCC): Primary | ICD-10-CM

## 2022-05-06 DIAGNOSIS — N91.2 AMENORRHEA: ICD-10-CM

## 2022-05-06 DIAGNOSIS — Z79.810 LONG-TERM CURRENT USE OF TAMOXIFEN: ICD-10-CM

## 2022-05-06 LAB
ESTRADIOL SERPL-MCNC: 22.5 PG/ML
FSH SERPL-ACNC: 44.7 MIU/ML
LH SERPL-ACNC: 26 MIU/ML

## 2022-05-06 PROCEDURE — 99214 OFFICE O/P EST MOD 30 MIN: CPT | Performed by: SPECIALIST

## 2022-05-06 RX ORDER — TAMOXIFEN CITRATE 20 MG/1
20 TABLET ORAL DAILY
Qty: 90 TABLET | Refills: 1 | Status: SHIPPED | OUTPATIENT
Start: 2022-05-06

## 2022-05-06 NOTE — PROGRESS NOTES
Education Record    Learner:  Patient    Disease / [address] breast ca      Barriers / Limitations:  None   Comments:    Method:  Discussion   Comments:    General Topics:  Plan of care reviewed   Comments:    Outcome:  Shows understanding   Comments:    Breast imaging up to date  Pt taking tamoxifen. Denies hot flashes. No menstruation.

## 2022-05-09 ENCOUNTER — TELEPHONE (OUTPATIENT)
Dept: HEMATOLOGY/ONCOLOGY | Facility: HOSPITAL | Age: 47
End: 2022-05-09

## 2022-05-09 NOTE — TELEPHONE ENCOUNTER
Left a message for Eleanora Backers regarding the lab results and that she should stay on tamoxifen, as recommended by Dr. Jasbir Rincon.

## 2022-05-09 NOTE — TELEPHONE ENCOUNTER
----- Message from Monae Winchester MD sent at 5/6/2022 12:29 PM CDT -----  Please call patient. Let her know that  her labs tests are not clearly post-menopausal so she should remain on tamoxifen.

## 2022-11-11 ENCOUNTER — HOSPITAL ENCOUNTER (OUTPATIENT)
Dept: MAMMOGRAPHY | Facility: HOSPITAL | Age: 47
Discharge: HOME | End: 2022-11-11
Attending: SPECIALIST
Payer: COMMERCIAL

## 2022-11-11 DIAGNOSIS — C50.912 INVASIVE DUCTAL CARCINOMA OF BREAST, FEMALE, LEFT (HCC): ICD-10-CM

## 2022-11-11 PROCEDURE — 77062 BREAST TOMOSYNTHESIS BI: CPT | Performed by: SPECIALIST

## 2022-11-11 PROCEDURE — 77066 DX MAMMO INCL CAD BI: CPT | Performed by: SPECIALIST

## 2022-11-18 ENCOUNTER — OFFICE VISIT (OUTPATIENT)
Dept: HEMATOLOGY/ONCOLOGY | Facility: HOSPITAL | Age: 47
End: 2022-11-18
Attending: SPECIALIST
Payer: COMMERCIAL

## 2022-11-29 ENCOUNTER — OFFICE VISIT (OUTPATIENT)
Dept: HEMATOLOGY/ONCOLOGY | Facility: HOSPITAL | Age: 47
End: 2022-11-29
Attending: SPECIALIST
Payer: COMMERCIAL

## 2022-11-29 VITALS
SYSTOLIC BLOOD PRESSURE: 142 MMHG | BODY MASS INDEX: 39.82 KG/M2 | HEART RATE: 95 BPM | DIASTOLIC BLOOD PRESSURE: 95 MMHG | OXYGEN SATURATION: 99 % | HEIGHT: 64.84 IN | RESPIRATION RATE: 16 BRPM | WEIGHT: 239 LBS | TEMPERATURE: 98 F

## 2022-11-29 DIAGNOSIS — C50.912 INVASIVE DUCTAL CARCINOMA OF BREAST, FEMALE, LEFT (HCC): ICD-10-CM

## 2022-11-29 DIAGNOSIS — Z79.810 LONG-TERM CURRENT USE OF TAMOXIFEN: Primary | ICD-10-CM

## 2022-11-29 PROCEDURE — 99214 OFFICE O/P EST MOD 30 MIN: CPT | Performed by: SPECIALIST

## 2022-11-29 NOTE — PROGRESS NOTES
Patient is here today for follow up with William Shah for Invasive Ductal Carcinoma Left Breast. Patient denies pain. Currently on Tamoxifen therapy. Denies adverse side effects to treatment. Medication list,medical history and toxicities were reviewed and updated. Education Record    Learner:  Patient      Disease / Diagnosis:  Invasive Ductal Carcinoma Left Breast    Barriers / Limitations:  None   Comments:    Method:  Brief focused, Discussion, Printed material and Reinforcement   Comments:    General Topics:  Medication, Pain, Procedure and Plan of care reviewed   Comments:    Outcome:  Shows understanding   Comments:    AVS provided and follow up reviewed. Patient instructed to call as needed.

## 2023-06-08 ENCOUNTER — OFFICE VISIT (OUTPATIENT)
Dept: HEMATOLOGY/ONCOLOGY | Facility: HOSPITAL | Age: 48
End: 2023-06-08
Attending: SPECIALIST
Payer: COMMERCIAL

## 2024-01-18 ENCOUNTER — OFFICE VISIT (OUTPATIENT)
Dept: HEMATOLOGY/ONCOLOGY | Facility: HOSPITAL | Age: 49
End: 2024-01-18
Attending: SPECIALIST
Payer: COMMERCIAL

## 2024-01-18 VITALS
HEART RATE: 86 BPM | BODY MASS INDEX: 39 KG/M2 | SYSTOLIC BLOOD PRESSURE: 155 MMHG | TEMPERATURE: 99 F | WEIGHT: 233.63 LBS | DIASTOLIC BLOOD PRESSURE: 96 MMHG | OXYGEN SATURATION: 97 % | RESPIRATION RATE: 18 BRPM

## 2024-01-18 DIAGNOSIS — Z85.3 HISTORY OF BREAST CANCER: Primary | ICD-10-CM

## 2024-01-18 PROCEDURE — 99215 OFFICE O/P EST HI 40 MIN: CPT | Performed by: SPECIALIST

## 2024-01-18 RX ORDER — FLUOXETINE HYDROCHLORIDE 20 MG/1
20 CAPSULE ORAL DAILY
COMMUNITY
Start: 2023-10-19

## 2024-01-18 RX ORDER — ERGOCALCIFEROL 1.25 MG/1
50000 CAPSULE ORAL
COMMUNITY

## 2024-01-18 NOTE — PROGRESS NOTES
Patient is here for MD follow up for Breast cancer. Patient c/o mild tenderness in left breast on occasion. Last mammogram was in November 2022. Patient reports she is still taking Tamoxifen daily.       Education Record    Learner:  Patient    Disease / Diagnosis:  Breast cancer     Barriers / Limitations:  None   Comments:    Method:  Discussion   Comments:    General Topics:  Plan of care reviewed   Comments:    Outcome:  Shows understanding   Comments:

## 2024-01-21 NOTE — PROGRESS NOTES
[facility] Progress Note      Patient Name: Faith Zamarripa   YOB: 1975  Medical Record Number: [id number]  Attending Physician: Segun Self M.D.     The 21st Century Cures Act makes medical notes like these available to patients in the interest of transparency. Please be advised this is a medical document. Medical documents are intended to carry relevant information, facts as evident, and the clinical opinion of the practitioner. The medical note is intended as peer to peer communication and may appear blunt or direct. It is written in medical language and may contain abbreviations or verbiage that are unfamiliar.     Date of Visit: 1/18/2024     Chief Complaint  Invasive ductal carcinoma, left breast, lower outer quadrant, [id number] - follow up.    Oncologic History  Faith Zamarripa is a 48 year old female who underwent screening mammography on 11/11/2019 which showed multiple clustered microcalcifications in the outer mid left breast. On 12/16/2019 she underwent diagnostic mammography of the left breast which showed pleomorphic calcifications in two groupings in the lower outer quadrant of the left breast. On 01/07/2020 she underwent stereotactic biopsy of two sites in the left breast. Pathology from the 3 o'clock position showed grade 2 invasive ductal carcinoma and grade 3 ductal carcinoma-in-situ. Pathology from the 4 o'clock position showed grade 3 ductal carcinoma-in-situ. Immunohistochemistry of the invasive disease showed the following: estrogen receptor 100% positive (strong), progesterone receptor 100% positive (strong), Her2/jessica 1+ (negative), and Ki67 15% (intermediate). The DCIS at the 4 o'clock position was estrogen and progesterone receptor positive. MRI of the breasts on 01/30/2020 showed focal area of abnormal enhancement measuring 2.2 cm surrounding the biopsy clips and no adenopathy.     On 02/14/2020 she underwent left breast lumpectomy with sentinel  lymph node biopsy. Pathology showed grade 3 invasive ductal carcinoma measuring 1.4 cm with focal lymphovascular invasion and associated grade 3 ductal carcinoma-in-situ and 1 lymph node negative for metastasis (0/1). Her2/jessica by IHC was 0 (negative).    Her OncotypeDX score was 28 which which results in a group average absolute chemotherapy benefit of >15%.    Genetic testing showed no pathogenic variant in the following 9 genes: DA, BRCA1, BRCA2, CDH1, CHEK2, PALB2, PTEN, STK11, and TP53    Patient was premenopausal at diagnosis.     Patient started adjuvant chemotherapy with docetaxel and cyclophosphamide on 03/19/2020. Cycle 1 was complicated by transient fatigue, diffuse joint aches, and diarrhea. Cycle 2 was started on 04/09/2020 and was complicated by transient fatigue, joint aches, and diarrhea. Patient also developed an area of erythema followed by desquamation at the site of IV site. Cycle 3 was started on 04/30/2020 and complicated by diarrhea, fatigue, and muscle and joint aches. Cycle 4 was started on 05/21/2020 and was complicated by fatigue.     From 06/23/2020 to 07/23/2020 she received adjuvant radiation therapy.     At the beginning of 09/2020 she started adjuvant endocrine therapy with tamoxifen. Patient had her Mirena IUD removed on 09/08/2020. She subsequently had a non-hormonal IUD placed.     Patient self discontinued tamoxifen in 03/2021. Patient then stated she restarted tamoxifen in 10/2021.    History of Present Illness  Patient returns for follow up after last being seen in 11/2022 and failing to follow up in 06/2023 as scheduled. Patient states that she returned to because she saw her primary care physician who told her that she could not order her annual mammography and that she needed to see me to get the order. However, there is an order in the system under the name of her primary care physician.     At the beginning of the appointment, patient stated that she was taking tamoxifen  daily as prescribed. However, when I pointed out to her that my last prescription was dated in 2022 for 90 tablets with 1 refill and that she cannot possibly have medication if she's been taking it daily, she admitted that she indeed has not been taking it.     In fact, in discussion with the patient it seems that she had never been taking the medication. Patient states that she simply forgets to take the medication. It is not an issue of toxicity. She states that she has used several strategies to remember to take the medication but each as failed.     She denies any self palpated masses or nipple discharge. She has no bony pain. She denies any respiratory complaints.     Performance Status   Karnofsky 100% - Normal, no complaints.    Past Medical History (historical data, reviewed by physician)  Left breast cancer (as above).     Past Surgical History (historical data, reviewed by physician)  Left breast lumpectomy with sentinel lymph node biopsy;  x3; gastric sleeve; Mirena IUD.    Family History (historical data, reviewed by physician)  Ms. Zamarripa’s mother, maternal aunt, and maternal grandmother had breast cancer in their 60s and 50s.  Ms. Zamarripa’s mother reportedly had negative genetic testing performed around  but a copy of her report is not available and no details regarding what testing was performed are known at this time.  Ms. Zamarripa’s father is an only child.  Ms. Zamarripa has two brothers and no sisters.      Social History (historical data, reviewed by physician)  Denies tobacco use.       Current Medications   FLUoxetine 20 MG Oral Cap Take 1 capsule (20 mg total) by mouth daily.      ergocalciferol 1.25 MG (58159 UT) Oral Cap Take 1 capsule (50,000 Units total) by mouth every 7 days.      tamoxifen 20 MG Oral Tab Take 1 tablet (20 mg total) by mouth daily. 90 tablet 1   Allergies   Ms. Zamarripa is allergic to adhesive tape (rosins).     Vital Signs   BP (!) 155/96 (BP Location: Right arm,  Patient Position: Sitting, Cuff Size: large)   Pulse 86   Temp 98.5 °F (36.9 °C) (Tympanic)   Resp 18   Wt 106 kg (233 lb 9.6 oz)   SpO2 97%   BMI 39.06 kg/m²     Physical Examination   Constitutional      Well developed, well nourished. Appears close to chronological age. No apparent distress.   Head   Normocephalic and atraumatic.  Eyes   Conjunctiva clear; sclera anicteric.  ENMT                 External nose normal; external ears normal.  Neck                   Supple; no masses.  Lymphatics  No cervical, supraclavicular, axillary adenopathy.  Breasts   No suspicious masses bilaterally.   Respiratory          Normal effort; no respiratory distress; clear to auscultation bilaterally.   Abdomen  Soft, not tender, not distended, no masses.    Extremities          No LE edema.   Neurologic           Motor and sensory grossly intact.  Psychiatric          Mood and affect appropriate.    Laboratory   No results found for this or any previous visit (from the past 48 hour(s)).     Impression and Plan   1.   Invasive ductal carcinoma, left breast: Patient's pathologic stage is [id number]. Her tumor was strongly estrogen and progesterone receptor positive and Her2/jessica negative. Her OncotypeDX score was 28 and she received 4 cycles of TC chemotherapy. She completed adjuvant radiation therapy.           As her tumor is hormone receptor positive, at least 5 years of adjuvant endocrine therapy is recommended.          As per HPI, patient has been non-compliant with tamoxifen. I had a lengthy discussion with the patient discussing strategies that could help her remember to take the medication but patient stated that they will all fail and they she feels that she is simply incapable of remembering to take medication daily. I reviewed the benefit of endocrine therapy given her risk of recurrence and she expressed understanding.           At the end of our discussion, patient stated that she feels that she will never take  tamoxifen as recommended. As her primary care physician has indeed ordered her annual mammography, she will continue regular follow up with that physician. She knows that she may return to see me at any time if there are any concerns regarding breast cancer.     Planned Follow Up   As above.     Encounter Time  Pre-charting/reviewing medical records: 10 minutes.  In room with patient obtaining history, performing exam, counseling on diagnosis, and reviewing plan: 30 minutes.  Orders/notes: - minutes.  Total time: 40 minutes.    Electronically signed by:    Segun Self M.D.  System Medical Director of Oncology Services  [facility]

## (undated) DIAGNOSIS — Z13.6 SCREENING FOR CARDIOVASCULAR CONDITION: Primary | ICD-10-CM

## (undated) DEVICE — CLEAR MONOFILAMENT (POLYDIOXANONE), ABSORBABLE SURGICAL SUTURE: Brand: PDS

## (undated) DEVICE — 3M™ STERI-DRAPE™ INSTRUMENT POUCH 1018: Brand: STERI-DRAPE™

## (undated) DEVICE — UNDERPAD INCNT 30X30IN PP HVY

## (undated) DEVICE — HEMOCLIP HORIZON SM 001200

## (undated) DEVICE — CONT SPEC SURG FAXITRON WEDGE

## (undated) DEVICE — TOWEL OR BLU 16X26 STRL

## (undated) DEVICE — SOL  .9 1000ML BTL

## (undated) DEVICE — STANDARD HYPODERMIC NEEDLE,POLYPROPYLENE HUB: Brand: MONOJECT

## (undated) DEVICE — SUTURE VICRYL 3-0 SH

## (undated) DEVICE — DRAPE,TAPE STRIPS,STERILE: Brand: MEDLINE

## (undated) DEVICE — HEMOCLIP HORIZON MED 002200

## (undated) DEVICE — ABDOMINAL PAD: Brand: DERMACEA

## (undated) DEVICE — BRA SURGICAL ELIZABETH PINK XL

## (undated) DEVICE — KENDALL SCD EXPRESS SLEEVES, KNEE LENGTH, MEDIUM: Brand: KENDALL SCD

## (undated) DEVICE — SUTURE SILK 2-0 FS

## (undated) DEVICE — STERILE (15.2 X 244CM) POLYETHYLENE TELESCOPICALLY-FOLDED COVER WITH ATTACHED (3CM) NEOGUARD™ TIP: Brand: SURGI-TIP TRANSDUCER COVER

## (undated) DEVICE — DRAPE PACK CHEST & U BAR

## (undated) DEVICE — SYRINGE 5ML LL TIP

## (undated) DEVICE — CAUTERY BLADE 2IN INS E1455

## (undated) DEVICE — STERILE LATEX POWDER-FREE SURGICAL GLOVES WITH HYDROGEL COATING, SMOOTH FINISH, STRAIGHT FINGER: Brand: PROTEXIS

## (undated) DEVICE — BREAST-HERNIA-PORT CDS-LF: Brand: MEDLINE INDUSTRIES, INC.

## (undated) DEVICE — SUTURE MONOCRYL 4-0 PS-2

## (undated) NOTE — MR AVS SNAPSHOT
After Visit Summary   10/30/2019    Courtney Santoyo    MRN: [id number]           Visit Information     Date & Time  10/30/2019  9:00 AM Provider  RASHIDA Ramirez [facility], [address] [place]  Dept.  Phone  [phone]- Educational Information    Healthy Diet and Regular Exercise  [employer] [facility] [employer] for making healthy food choices    Enjoy your food, but eat less. Fully enjoy your food when eating. Don’t eat while distracted and slow down.    Av experience and are looking for ways to make improvements. Your feedback will help us do so. For more information on [employer], please visit [email or web address]                   DO YOU KNOW WHERE TO GO?   Injury & illness are neve

## (undated) NOTE — LETTER
[address] [facility], :10/5/1975    CONSENT FOR PROCEDURE/SEDATION    1. I authorize the performance upon [address] [place] [address]  the following: Mirena IUD removal and paragard Insertion    2.  I authorize Dr. More Tran MD (and whomever is designated as the do Relationship to patient: ____________________________________________    Witness: _________________________________________ Date:___________     Physician Signature: _______________________________ Date:___________

## (undated) NOTE — LETTER
[address], :10/5/1975    CONSENT FOR PROCEDURE/SEDATION    1. I authorize the performance upon Esthela Vanna [address]  the following: TCA warts removal    2.  I authorize RASHIDA Romero (and whomever is designated as the doctor’s assistant), to [employer] Witness: _________________________________________ Date:___________     Physician Signature: _______________________________ Date:___________

## (undated) NOTE — LETTER
Printed: 3/18/2020    Patient Name: Norah Dickey  : 10/5/1975   Medical Record #: [id number]    Consent to Helen Vazquez, understand that I have been diagnosed with breast cancer (stage IA).     I understand that the t understand that complications from this treatment could even result in my death. Alternatives to this treatment have been explained to me and could include the option of no treatment. I also understand that I may stop this treatment at any time.     I